# Patient Record
Sex: MALE | Race: WHITE | NOT HISPANIC OR LATINO
[De-identification: names, ages, dates, MRNs, and addresses within clinical notes are randomized per-mention and may not be internally consistent; named-entity substitution may affect disease eponyms.]

---

## 2018-05-17 PROBLEM — Z00.00 ENCOUNTER FOR PREVENTIVE HEALTH EXAMINATION: Status: ACTIVE | Noted: 2018-05-17

## 2018-05-22 ENCOUNTER — APPOINTMENT (OUTPATIENT)
Dept: ORTHOPEDIC SURGERY | Facility: CLINIC | Age: 63
End: 2018-05-22
Payer: COMMERCIAL

## 2018-05-22 DIAGNOSIS — Z86.59 PERSONAL HISTORY OF OTHER MENTAL AND BEHAVIORAL DISORDERS: ICD-10-CM

## 2018-05-22 DIAGNOSIS — Z86.39 PERSONAL HISTORY OF OTHER ENDOCRINE, NUTRITIONAL AND METABOLIC DISEASE: ICD-10-CM

## 2018-05-22 DIAGNOSIS — Z82.62 FAMILY HISTORY OF OSTEOPOROSIS: ICD-10-CM

## 2018-05-22 DIAGNOSIS — Z85.46 PERSONAL HISTORY OF MALIGNANT NEOPLASM OF PROSTATE: ICD-10-CM

## 2018-05-22 DIAGNOSIS — Z60.2 PROBLEMS RELATED TO LIVING ALONE: ICD-10-CM

## 2018-05-22 PROCEDURE — 99203 OFFICE O/P NEW LOW 30 MIN: CPT

## 2018-05-22 SDOH — SOCIAL STABILITY - SOCIAL INSECURITY: PROBLEMS RELATED TO LIVING ALONE: Z60.2

## 2018-08-21 ENCOUNTER — FORM ENCOUNTER (OUTPATIENT)
Age: 63
End: 2018-08-21

## 2018-08-22 ENCOUNTER — APPOINTMENT (OUTPATIENT)
Dept: MRI IMAGING | Facility: CLINIC | Age: 63
End: 2018-08-22
Payer: COMMERCIAL

## 2018-08-22 ENCOUNTER — OUTPATIENT (OUTPATIENT)
Dept: OUTPATIENT SERVICES | Facility: HOSPITAL | Age: 63
LOS: 1 days | End: 2018-08-22
Payer: COMMERCIAL

## 2018-08-22 DIAGNOSIS — Z00.8 ENCOUNTER FOR OTHER GENERAL EXAMINATION: ICD-10-CM

## 2018-08-22 PROCEDURE — 72197 MRI PELVIS W/O & W/DYE: CPT | Mod: 26

## 2018-08-22 PROCEDURE — 82565 ASSAY OF CREATININE: CPT

## 2018-08-22 PROCEDURE — A9585: CPT

## 2018-08-22 PROCEDURE — 72197 MRI PELVIS W/O & W/DYE: CPT

## 2018-09-04 ENCOUNTER — APPOINTMENT (OUTPATIENT)
Dept: ORTHOPEDIC SURGERY | Facility: CLINIC | Age: 63
End: 2018-09-04
Payer: COMMERCIAL

## 2018-09-04 PROCEDURE — 99213 OFFICE O/P EST LOW 20 MIN: CPT

## 2018-12-05 ENCOUNTER — RESULT REVIEW (OUTPATIENT)
Age: 63
End: 2018-12-05

## 2018-12-24 ENCOUNTER — EMERGENCY (EMERGENCY)
Facility: HOSPITAL | Age: 63
LOS: 1 days | Discharge: HOME | End: 2018-12-24
Attending: EMERGENCY MEDICINE
Payer: COMMERCIAL

## 2018-12-24 VITALS
SYSTOLIC BLOOD PRESSURE: 189 MMHG | TEMPERATURE: 99 F | DIASTOLIC BLOOD PRESSURE: 97 MMHG | WEIGHT: 179.9 LBS | HEART RATE: 98 BPM | RESPIRATION RATE: 16 BRPM | OXYGEN SATURATION: 98 %

## 2018-12-24 VITALS
DIASTOLIC BLOOD PRESSURE: 81 MMHG | HEART RATE: 92 BPM | SYSTOLIC BLOOD PRESSURE: 123 MMHG | RESPIRATION RATE: 20 BRPM | TEMPERATURE: 98 F | OXYGEN SATURATION: 99 %

## 2018-12-24 PROCEDURE — 71045 X-RAY EXAM CHEST 1 VIEW: CPT

## 2018-12-24 PROCEDURE — 99283 EMERGENCY DEPT VISIT LOW MDM: CPT

## 2018-12-24 PROCEDURE — 71045 X-RAY EXAM CHEST 1 VIEW: CPT | Mod: 26

## 2018-12-24 NOTE — ED PROVIDER NOTE - MEDICAL DECISION MAKING DETAILS
63M currently being treated for osteomyelitis. Had ABX changed to Ertapenem today, but pt is unsure why. Here in ER for second opinion. Pt is stable, non-toxic appearing. He is adherent to medication, and is able to resume the IV ABX today at home. Will give pt number for ID clinic, but at this current time there is no reason to believe he is being improperly managed as an outpatient. 63M currently being treated for osteomyelitis. Had ABX changed to Ertapenem today, but pt is unsure why. Here in ER for second opinion. Pt is stable, non-toxic appearing. He is adherent to medication, and is able to resume the IV ABX today at home. Will give pt number for ID clinic, but at this current time there is no reason to believe he is being improperly managed as an outpatient.    ATTENDING MD Joann: appears to have mild masseter muscle strain whichs improved with heat and massage. pt resassured about broad coverage of antibiotics, per history is improving appropriately, advised him to go to ID clinic if he needs 2nd opinion. he understands and is amenable, will Dc w/ f./u

## 2018-12-24 NOTE — ED PROVIDER NOTE - OBJECTIVE STATEMENT
63M PMH HLD, HTN, DM, had impacted infected molar removed in october. Went to [facility] last week for IV abx and dc'd with picc line and cephipime. Presenting to ER as pt is having increasing pain and abx were changed to Ertapenem but pt is unsure why. 63M PMH HLD, HTN, DM, had impacted infected molar removed in october. C/b by osteomyelitis, went to [facility] last week for IV abx, seen by ID and dc'd with picc line and cefepime. Presenting to ER as pt is having increasing pain and abx were changed to Ertapenem but pt is unsure why. States swelling has decreased, and he not had any fevers, chills, n/v/d or any other complaints. Came to ER for second opinion regarding ABX. 63M PMH HLD, HTN, DM, had impacted infected molar removed in october. C/b by osteomyelitis, went to [facility] last week for IV abx, seen by ID and lovely'd with picc line and cefepime. Presenting to ER as pt is for some concern that he is not being managed appropriately. He notes his swelling is going down and he does not have systemic symptoms, but in the last few days he has been having some slightly increasing pain when he chews and brushes his teeth. He also notes his abx were changed to Ertapenem from cefepime but pt is unsure why. States swelling has decreased, and he not had any fevers, chills, n/v/d or any other complaints. Came to ER for second opinion regarding ABX.

## 2018-12-24 NOTE — ED PROVIDER NOTE - NS ED ROS FT
Constitution: No Fever or chills  Eyes: No visual changes  HEENT: No URI symptoms  Cardio: No Chest pain  Resp: No SOB  GI: No abdominal pain  : No dysuria  MSK +jaw pain  Neuro: No Headache  Skin: No rashes  All other ROS as per HPI  Ac De La O, PGY-1

## 2018-12-24 NOTE — ED PROVIDER NOTE - PHYSICAL EXAMINATION
General: WDWN  HEENT: There is some R sided swelling over the mandible. TTP over the upper molars, but there is not discharge or fluctuance from the teeth. Trachea midline.   Cardiac: Normal S1 and S2 w/ RRR. No MRG.  Pulmonary: CTA bilaterally. No increased WOB.   Abdominal: Soft, NTND  Neurologic: No focal sensory or motor deficits.  Musculoskeletal: No limited ROM.  Vascular: WWP. PICC line in R upper extremity clean/dry.   Skin: Color appropriate for race.   Psychiatric: Appropriate mood and affect. No apparent risk to self or others.  Ac [place] O, PGY-1 General: WDWN  HEENT: There is some R sided swelling over the mandible. TTP over the upper molars, but there is not discharge or fluctuance from the teeth. Trachea midline.   Cardiac: Normal S1 and S2 w/ RRR. No MRG.  Pulmonary: CTA bilaterally. No increased WOB.   Abdominal: Soft, NTND  Neurologic: No focal sensory or motor deficits.  Musculoskeletal: No limited ROM.  Vascular: WWP. PICC line in R upper extremity clean/dry.   Skin: Color appropriate for race.   Psychiatric: Appropriate mood and affect. No apparent risk to self or others.  Ac [place], PGY-1    ATTENDING MD Joann: well appearing, NAD, pleasant, awake, alert nontoxic. AOx4. mucus membranes are moist, oropharynx is within normal limits, mild buccal swelling, tenderness on R-masseter improve diwth heat and massage, no dysphonia, no trismus, no sublingual tenderness, no fluctulance or purulence intraorally appreciated, no significant facial tenderness. heart normal rate, regular rhythm, lungs clear to auscultation bilaterally, equal breath sounds bilaterally, abd soft nontender, skin warm and dry with no overlying changes.

## 2018-12-24 NOTE — ED ADULT NURSE NOTE - OBJECTIVE STATEMENT
62y/o Male presented to the ED ambulatory, A&Ox3, complaining of jaw pain. Pt states he had an infected/impacted wisdom tooth extraction on october 15th. Pt was admitted to [facility] for osteomyelitis of mandible and was given IV Cefepime for two days. Picc line placed to continue Cefepime at home, last night was the last dose. Pt came to have a second opinion since swelling has gone down but not gone away. Swelling noted on left jaw. MD aware of picc line. Awaiting chest x-ray for placement verification and ok from MD before accessing. First set of cultures taken from butterfly on left arm, second will be taken off of picc line once accessed. Pt denies CP, SOB, fevers, chills, cough, trouble swallowing, difficulty breathing. Pt has a patent airway, is maintaining secretions, and states he has no pain or swelling in his throat. Pt states he has trouble eating because of the pain, but does not have trouble swallowing the food. Pt Hx of Barrets Esophagus, High cholesterol and Htn. Elevated BP noted, other VS stable. Safety and comfort measures provided. Call bell within reach. Awaiting Picc line placement verification and blood test results. Will continue to monitor. 62y/o Male presented to the ED ambulatory, A&Ox3, complaining of jaw pain. Pt states he had an infected/impacted wisdom tooth extraction on october 15th. Pt was admitted to [facility] for osteomyelitis of mandible and was given IV Cefepime for two days. Picc line placed to continue Cefepime at home, last night was the last dose. Pt came to have a second opinion since swelling has gone down but not gone away. Swelling noted on R. jaw. MD aware of picc line. Awaiting chest x-ray for placement verification and ok from MD before accessing. First set of cultures taken from butterfly on left arm, second will be taken off of picc line once accessed. Pt denies CP, SOB, fevers, chills, cough, trouble swallowing, difficulty breathing. Pt has a patent airway, is maintaining secretions, and states he has no pain or swelling in his throat. No tongue swelling. Pt states he has trouble eating because of the pain, but does not have trouble swallowing the food. Pt Hx of Barrets Esophagus, High cholesterol and Htn. Elevated BP noted, other VS stable. Safety and comfort measures provided. Call bell within reach. Awaiting Picc line placement verification and blood test results. Will continue to monitor.

## 2018-12-24 NOTE — ED PROVIDER NOTE - ATTENDING CONTRIBUTION TO CARE
ATTENDING MD:  I, Jerry David, personally have seen and examined this patient.  I have discussed all aspects of care with the resident physician. Resident note reviewed and agree on plan of care and except where noted.  See HPI, PE, and MDM for details.

## 2018-12-24 NOTE — ED ADULT TRIAGE NOTE - CHIEF COMPLAINT QUOTE
osteomyelitis of mandible has PICC for IV antibiotics, has pain to jaw and wants second opinion regarding treatment regimen

## 2018-12-24 NOTE — ED ADULT NURSE NOTE - NSIMPLEMENTINTERV_GEN_ALL_ED
Implemented All Universal Safety Interventions:  [place] to call system. Call bell, personal items and telephone within reach. Instruct patient to call for assistance. Room bathroom lighting operational. Non-slip footwear when patient is off stretcher. Physically safe environment: no spills, clutter or unnecessary equipment. Stretcher in lowest position, wheels locked, appropriate side rails in place.

## 2018-12-24 NOTE — ED PROVIDER NOTE - NSFOLLOWUPCLINICS_GEN_ALL_ED_FT
[facility] - Infectious Disease  Infectious Disease  [address], [place] [address]  Phone: [phone]  Fax:   Follow Up Time:

## 2018-12-24 NOTE — ED PROVIDER NOTE - NSFOLLOWUPINSTRUCTIONS_ED_ALL_ED_FT
Please resume taking antibiotics as prescribed by your current Infectious Disease specialist.     If you are having fevers, chills, difficulty swallowing or difficulty breathing please return to the ER immediately.     Please follow up at your previously scheduled ID appointment.     You may call the [facility] ID clinic as well at the number provided.

## 2018-12-26 ENCOUNTER — EMERGENCY (EMERGENCY)
Facility: HOSPITAL | Age: 63
LOS: 1 days | Discharge: HOME | End: 2018-12-26
Attending: EMERGENCY MEDICINE
Payer: COMMERCIAL

## 2018-12-26 VITALS
HEART RATE: 74 BPM | SYSTOLIC BLOOD PRESSURE: 172 MMHG | RESPIRATION RATE: 18 BRPM | DIASTOLIC BLOOD PRESSURE: 84 MMHG | OXYGEN SATURATION: 99 %

## 2018-12-26 VITALS
HEIGHT: 66 IN | WEIGHT: 179.9 LBS | TEMPERATURE: 99 F | DIASTOLIC BLOOD PRESSURE: 88 MMHG | RESPIRATION RATE: 16 BRPM | SYSTOLIC BLOOD PRESSURE: 156 MMHG | OXYGEN SATURATION: 97 % | HEART RATE: 96 BPM

## 2018-12-26 LAB
ALBUMIN SERPL ELPH-MCNC: 4.3 G/DL — SIGNIFICANT CHANGE UP (ref 3.3–5)
ALP SERPL-CCNC: 81 U/L — SIGNIFICANT CHANGE UP (ref 40–120)
ALT FLD-CCNC: 18 U/L — SIGNIFICANT CHANGE UP (ref 10–45)
ANION GAP SERPL CALC-SCNC: 14 MMOL/L — SIGNIFICANT CHANGE UP (ref 5–17)
AST SERPL-CCNC: 38 U/L — SIGNIFICANT CHANGE UP (ref 10–40)
BASOPHILS # BLD AUTO: 0.1 K/UL — SIGNIFICANT CHANGE UP (ref 0–0.2)
BASOPHILS NFR BLD AUTO: 1.4 % — SIGNIFICANT CHANGE UP (ref 0–2)
BILIRUB SERPL-MCNC: 0.2 MG/DL — SIGNIFICANT CHANGE UP (ref 0.2–1.2)
BUN SERPL-MCNC: 14 MG/DL — SIGNIFICANT CHANGE UP (ref 7–23)
CALCIUM SERPL-MCNC: 9.8 MG/DL — SIGNIFICANT CHANGE UP (ref 8.4–10.5)
CHLORIDE SERPL-SCNC: 103 MMOL/L — SIGNIFICANT CHANGE UP (ref 96–108)
CO2 SERPL-SCNC: 20 MMOL/L — LOW (ref 22–31)
CREAT SERPL-MCNC: 1.02 MG/DL — SIGNIFICANT CHANGE UP (ref 0.5–1.3)
EOSINOPHIL # BLD AUTO: 0.1 K/UL — SIGNIFICANT CHANGE UP (ref 0–0.5)
EOSINOPHIL NFR BLD AUTO: 2.9 % — SIGNIFICANT CHANGE UP (ref 0–6)
GLUCOSE SERPL-MCNC: 100 MG/DL — HIGH (ref 70–99)
HCT VFR BLD CALC: 33.2 % — LOW (ref 39–50)
HGB BLD-MCNC: 11 G/DL — LOW (ref 13–17)
LYMPHOCYTES # BLD AUTO: 1.1 K/UL — SIGNIFICANT CHANGE UP (ref 1–3.3)
LYMPHOCYTES # BLD AUTO: 26.5 % — SIGNIFICANT CHANGE UP (ref 13–44)
MCHC RBC-ENTMCNC: 30.2 PG — SIGNIFICANT CHANGE UP (ref 27–34)
MCHC RBC-ENTMCNC: 33.1 GM/DL — SIGNIFICANT CHANGE UP (ref 32–36)
MCV RBC AUTO: 91.2 FL — SIGNIFICANT CHANGE UP (ref 80–100)
MONOCYTES # BLD AUTO: 0.3 K/UL — SIGNIFICANT CHANGE UP (ref 0–0.9)
MONOCYTES NFR BLD AUTO: 7.3 % — SIGNIFICANT CHANGE UP (ref 2–14)
NEUTROPHILS # BLD AUTO: 2.6 K/UL — SIGNIFICANT CHANGE UP (ref 1.8–7.4)
NEUTROPHILS NFR BLD AUTO: 61.8 % — SIGNIFICANT CHANGE UP (ref 43–77)
PLATELET # BLD AUTO: 339 K/UL — SIGNIFICANT CHANGE UP (ref 150–400)
POTASSIUM SERPL-MCNC: 4.6 MMOL/L — SIGNIFICANT CHANGE UP (ref 3.5–5.3)
POTASSIUM SERPL-SCNC: 4.6 MMOL/L — SIGNIFICANT CHANGE UP (ref 3.5–5.3)
PROT SERPL-MCNC: 7.7 G/DL — SIGNIFICANT CHANGE UP (ref 6–8.3)
RBC # BLD: 3.65 M/UL — LOW (ref 4.2–5.8)
RBC # FLD: 16.9 % — HIGH (ref 10.3–14.5)
SODIUM SERPL-SCNC: 137 MMOL/L — SIGNIFICANT CHANGE UP (ref 135–145)
WBC # BLD: 4.2 K/UL — SIGNIFICANT CHANGE UP (ref 3.8–10.5)
WBC # FLD AUTO: 4.2 K/UL — SIGNIFICANT CHANGE UP (ref 3.8–10.5)

## 2018-12-26 PROCEDURE — 70487 CT MAXILLOFACIAL W/DYE: CPT | Mod: 26

## 2018-12-26 PROCEDURE — 99284 EMERGENCY DEPT VISIT MOD MDM: CPT

## 2018-12-26 PROCEDURE — 80053 COMPREHEN METABOLIC PANEL: CPT

## 2018-12-26 PROCEDURE — 85027 COMPLETE CBC AUTOMATED: CPT

## 2018-12-26 PROCEDURE — 99284 EMERGENCY DEPT VISIT MOD MDM: CPT | Mod: 25

## 2018-12-26 PROCEDURE — 96374 THER/PROPH/DIAG INJ IV PUSH: CPT | Mod: XU

## 2018-12-26 PROCEDURE — 70487 CT MAXILLOFACIAL W/DYE: CPT

## 2018-12-26 RX ORDER — ERTAPENEM SODIUM 1 G/1
1000 INJECTION, POWDER, LYOPHILIZED, FOR SOLUTION INTRAMUSCULAR; INTRAVENOUS ONCE
Qty: 0 | Refills: 0 | Status: COMPLETED | OUTPATIENT
Start: 2018-12-26 | End: 2018-12-26

## 2018-12-26 RX ORDER — ACETAMINOPHEN 500 MG
975 TABLET ORAL ONCE
Qty: 0 | Refills: 0 | Status: COMPLETED | OUTPATIENT
Start: 2018-12-26 | End: 2018-12-26

## 2018-12-26 RX ORDER — IBUPROFEN 200 MG
400 TABLET ORAL ONCE
Qty: 0 | Refills: 0 | Status: COMPLETED | OUTPATIENT
Start: 2018-12-26 | End: 2018-12-26

## 2018-12-26 RX ADMIN — Medication 975 MILLIGRAM(S): at 18:28

## 2018-12-26 RX ADMIN — Medication 400 MILLIGRAM(S): at 18:28

## 2018-12-26 RX ADMIN — ERTAPENEM SODIUM 120 MILLIGRAM(S): 1 INJECTION, POWDER, LYOPHILIZED, FOR SOLUTION INTRAMUSCULAR; INTRAVENOUS at 15:01

## 2018-12-26 NOTE — ED ADULT TRIAGE NOTE - CHIEF COMPLAINT QUOTE
Pain in right side of mouth / top and bottom with swelling. Has been on antibiotics for the past two months

## 2018-12-26 NOTE — ED PROVIDER NOTE - MEDICAL DECISION MAKING DETAILS
Attending note-patient with increasing pain in the right side of mandible with prior history of osteomyelitis and currently getting a IV antibiotics through a PICC line. A repeat CT scan with contrast and compared to CT of December 12. Basic labs.

## 2018-12-26 NOTE — ED ADULT NURSE NOTE - OBJECTIVE STATEMENT
62 yo presents to the ED from home. A&Ox4, ambulatory 64 yo presents to the ED from home. A&Ox4, ambulatory co R upper and lower tooth pain. had impacted infected molar removed in october. C/b by osteomyelitis, went to [facility] last week for IV abx, seen by ID and dc'd with picc line in R upper arm on Ertapenem. coming to ER again because pt was referred to ID after visit on Monday and when he called the clinic was told he needed a referral and to go back to the ER to get a referral. Swelling continues to improve however having worsening pain w/ chewing/using jaw which is reason for patient concern and desire to see new ID doc. On day 3 of Ertapenem. No F/C. No N/V/D. VSS. 20G inserted L wrist.

## 2018-12-26 NOTE — CONSULT NOTE ADULT - SUBJECTIVE AND OBJECTIVE BOX
Patient is a 63y old  Male who presents with a chief complaint of mouth pain.    HPI: Patient had lower right third molar extracted in October 2018 due to pain and swelling by an outpatient oral surgeon. Pain and swelling did not immediately resolve and was seen for multiple post operative visits where debridements and multiple changes in antibiotic coverage was made. Patient continued to have pain and swelling and saw a different oral surgeon on 12/15/2018 who took an additional CT scan which showed soft tissue infection associated with the right masseter with associated osteomyelitis. Patient visited [facility] one week ago and was admitted under Infectious Diseases. A PICC line was placed and patient was prescribed cefepime and later ertapenem (currently taking) which resolved the swelling but increased the pain. Patient visited [facility] ED today.      PAST MEDICAL & SURGICAL HISTORY:  No pertinent past medical history  No significant past surgical history    Depression  Degenerative disc disease (lower back)  Lawler's Esophagus  High cholesterol  High blood pressure  Arteriosclerosis  Atherosclerosis  Enlarged prostate  Osteopenia      MEDICATIONS  (STANDING):    Duloxetine  Bupropion  Mirtazepam  Lorazepam  Ranitidine  Rosuvastatin  Amlodipine  Clobetasol  Aspirin  Ferrous Sulfate  Terazosin  Ertapenem    MEDICATIONS  (PRN):      Allergies    No Known Allergies    Intolerances        FAMILY HISTORY:      *SOCIAL HISTORY: (guardian or who pt came with), (smoking hx)    *Last Dental Visit:    Vital Signs Last 24 Hrs  T(C): 36.7 (26 Dec 2018 15:36), Max: 37 (26 Dec 2018 12:58)  T(F): 98 (26 Dec 2018 15:36), Max: 98.6 (26 Dec 2018 12:58)  HR: 74 (26 Dec 2018 19:55) (74 - 96)  BP: 172/84 (26 Dec 2018 19:55) (156/88 - 172/84)  BP(mean): --  RR: 18 (26 Dec 2018 19:55) (16 - 18)  SpO2: 99% (26 Dec 2018 19:55) (97% - 99%)    EOE:  TMJ ( -  ) clicks                    ( -   ) pops                    (  -  ) crepitus             ( -  ) trismus. Patient demonstrates pain upon opening but is able to be stretched past 35mm+ opening             ( -  ) LAD             ( -  ) swelling             ( -  ) asymmetry             ( +  ) palpation. Pain on palpation of right cheek in the region of third molar extraction site             ( -  ) SOB             ( -  ) dysphagia             ( -  ) LOC    Patient states that he experiences mild parasthesia on lower right side and that pain extends from extraction socket (greatest pain) to the lower midline (mild pain). Pain is rated as 4/10 overall.    IOE:    Extraction site #32 appears to continue healing normally with no signs of erythema, purluence or fluctuance. Pain upon palpation further posteriorly toward the masseter muscle but no pain upon palpation of the extraction site or the associated soft tissue. Floor of mouth and buccal vestibule normal.    Radiographs: Maxillofacial CT taken    Mixed lucent/sclerotic changes within the mandible adjacent to an unhealed   right third molar extraction socket with juxta mandibular soft tissue   swelling and fat plane reticulation. Findings favor an inflammatory process   such as osteomyelitis. Enlargement of the right masseter is compatible with   associated myositis. The possibility of osteonecrosis is raised in the   appropriate clinical setting.     LABS:                        11.0   4.2   )-----------( 339      ( 26 Dec 2018 15:09 )             33.2     12-26    137  |  103  |  14  ----------------------------<  100<H>  4.6   |  20<L>  |  1.02    Ca    9.8      26 Dec 2018 15:09    TPro  7.7  /  Alb  4.3  /  TBili  0.2  /  DBili  x   /  AST  38  /  ALT  18  /  AlkPhos  81  12-26    WBC Count: 4.2 K/uL [3.8 - 10.5] (12-26 @ 15:09)    Platelet Count - Automated: 339 K/uL [150 - 400] (12-26 @ 15:09)    ASSESSMENT: My assessment is extraction site #32 appears to be healing normally. Osteomyelitis cannot be ruled out but dental source appears to have been resolved due to lack of acute findings other than pain.    PROCEDURE:  Extraoral and intraoral examination.    RECOMMENDATIONS:   1) Consult other medical specialties (Neurology, ID, Pain management etc.) for other possible causes of pain.  2) Dental F/U with outpatient dentist for comprehensive dental care.   3) If any oral swelling returns, page dental.    Sajan Pagan DDS, pager #[phone]  Oral surgeon consulted: Dr. Avinash Sheikh

## 2018-12-26 NOTE — ED PROVIDER NOTE - CARE PROVIDERS DIRECT ADDRESSES
,DirectAddress_Unknown,[email or web address],DirectAddress_Unknown

## 2018-12-26 NOTE — ED PROVIDER NOTE - PHYSICAL EXAMINATION
PHYSICAL EXAM:  GENERAL: NAD, speaks in full sentences, no signs of respiratory distress  HEAD:  Atraumatic, Normocephalic  EYES: EOMI, PERRLA, conjunctiva and sclera clear  Jaw: Swelling of R parotid, tender  Oropharynx: Poor dention no abscess/swelling apprieciated  CHEST/LUNG: Clear to auscultation bilaterally; No wheeze; No crackles; No accessory muscles used  HEART: Regular rate and rhythm; No murmurs;   ABDOMEN: Soft, Nontender, Nondistended; Bowel sounds present; No guarding  EXTREMITIES:  2+ Peripheral Pulses, No cyanosis or edema  PSYCH: AAOx3  NEUROLOGY: moving all extremities PHYSICAL EXAM:  GENERAL: NAD, speaks in full sentences, no signs of respiratory distress  HEAD:  Atraumatic, Normocephalic  EYES: EOMI, PERRLA, conjunctiva and sclera clear  Jaw: Swelling of R parotid, tender  Oropharynx: Poor dention no abscess/swelling apprieciated  CHEST/LUNG: Clear to auscultation bilaterally; No wheeze; No crackles; No accessory muscles used  HEART: Regular rate and rhythm; No murmurs;   ABDOMEN: Soft, Nontender, Nondistended; Bowel sounds present; No guarding  EXTREMITIES:  2+ Peripheral Pulses, No cyanosis or edema  PSYCH: AAOx3  NEUROLOGY: moving all extremities      Attending note. Patient is alert and in no acute distress. There is no facial swelling or erythema. Patient has tenderness along the right side of the mandible. There is some gingival tenderness on the right lower teeth opposite tooth #32/31. There is no fluctuance. Sublingual space is soft and nontender. There is no submandibular adenopathy. Patient has no trismus. Patient has some decreased sensation of the right lower lip. TMJ is nontender.

## 2018-12-26 NOTE — ED PROVIDER NOTE - NSFOLLOWUPINSTRUCTIONS_ED_ALL_ED_FT
1) Please follow-up with your Primary Medical Doctor in 3-5 days. You should follow up first with Infectious Disease and OMFS. I am referring you to an Infectious Disease physician, phone number is provided. Also may try Neurology and Pain Management consultations.   2) Return to the Emergency Department if you experiences: fevers, chills, dizziness, weakness, worsening of swelling, difficulty breathing, or symptoms that are new or recurrent.  3) Continue with the previously prescribed regimen of medications.

## 2018-12-26 NOTE — ED PROVIDER NOTE - OBJECTIVE STATEMENT
63M PMH HLD, HTN, DM, had impacted infected molar removed in october. C/b by osteomyelitis, went to [facility] last week for IV abx, seen by ID and dc'd with picc line and cefepime.    Coming to ER again because pt was reffered to ID after visit on monday and when he called the clinic was told he needed a refferal and to go back to the ER to get a refferal. Swelling continues to improve however having worsening pain w/ chewing/using jaw. On day 3 of ertepenam. No F/C. No N/V/D. Has ID at 63M PMH HLD, HTN, DM, had impacted infected molar removed in october. C/b by osteomyelitis, went to [facility] last week for IV abx, seen by ID and lovely'd with picc line and cefepime.    Coming to ER again because pt was referred to ID after visit on Monday and when he called the clinic was told he needed a referral and to go back to the ER to get a referral. Swelling continues to improve however having worsening pain w/ chewing/using jaw. On day 3 of ertepenam. No F/C. No N/V/D. Has ID at [facility] but wants new doctor. No h/o DM. 63M PMH HLD, HTN, DM, had impacted infected molar removed in october. C/b by osteomyelitis, went to \[facility]"" last week for IV abx, seen by ID and dc'd with picc line on ertapenamn p/w cc of pain    Coming to ER again because pt was referred to ID after visit on Monday and when he called the clinic was told he needed a referral and to go back to the ER to get a referral. Swelling continues to improve however having worsening pain w/ chewing/using jaw which is reason for patient concern and desire to see new ID doc. On day 3 of ertepenam. No F/C. No N/V/D. Denies h/o DM. 63M PMH HLD, HTN, DM, had impacted infected molar removed in october. C/b by osteomyelitis, went to \[facility]\"" last week for IV abx, seen by ID and dc'd with picc line on ertapenamn p/w cc of pain    Coming to ER again because pt was referred to ID after visit on Monday and when he called the clinic was told he needed a referral and to go back to the ER to get a referral. Swelling continues to improve however having worsening pain w/ chewing/using jaw which is reason for patient concern and desire to see new ID doc. On day 3 of ertepenam. No F/C. No N/V/D. Denies h/o DM.      Attending note. Patient was seen in the fast track room #5. Patient is complaining of increased pain in the right mandible and maxilla. The patient had tooth extraction in October and subsequently developed osteomyelitis. Patient currently has a PICC line and is getting IV Ertapenem.  Patient initially had significant swelling which has resolved and not returned. He has no fever chills or sweats. Pain is exacerbated by biting and opening his mouth. Patient is concerned he's getting recurrent infection.

## 2018-12-26 NOTE — ED PROVIDER NOTE - CARE PROVIDER_API CALL
Avinash Sheikh (DDS; MD), Surgery Dental Medicine  [address] [place] [address]  Phone: [phone]  Fax: [phone]    Colton Hahn), Geriatric Medicine; Infectious Disease; Internal Medicine  [address] [place] [address]  Phone: [phone]  Fax: [phone]    Leslie Betancourt), Neurology  [address] [place] [address]  Phone: [phone]

## 2019-01-03 ENCOUNTER — APPOINTMENT (OUTPATIENT)
Dept: INFECTIOUS DISEASE | Facility: CLINIC | Age: 64
End: 2019-01-03
Payer: COMMERCIAL

## 2019-01-03 VITALS
DIASTOLIC BLOOD PRESSURE: 78 MMHG | RESPIRATION RATE: 16 BRPM | TEMPERATURE: 97 F | WEIGHT: 175 LBS | HEIGHT: 66 IN | OXYGEN SATURATION: 97 % | HEART RATE: 88 BPM | BODY MASS INDEX: 28.12 KG/M2 | SYSTOLIC BLOOD PRESSURE: 152 MMHG

## 2019-01-03 PROCEDURE — 99243 OFF/OP CNSLTJ NEW/EST LOW 30: CPT

## 2019-01-08 NOTE — REVIEW OF SYSTEMS
[As Noted in HPI] : as noted in HPI [Earache] : no earache [Loss Of Hearing] : no hearing loss [Sore Throat] : no sore throat [Hoarseness] : no hoarseness [Negative] : Heme/Lymph

## 2019-01-08 NOTE — DATA REVIEWED
[FreeTextEntry1] : Cultures - serratia, anerobes\par \par Ct maxillofacial 12/12/18 - soft tissue infection involving right masseter and adjacent soft tissues and probable mandible OM

## 2019-01-08 NOTE — PHYSICAL EXAM
[General Appearance - Alert] : alert [General Appearance - In No Acute Distress] : in no acute distress [Sclera] : the sclera and conjunctiva were normal [PERRL With Normal Accommodation] : pupils were equal in size, round, reactive to light [Extraocular Movements] : extraocular movements were intact [Examination Of The Oral Cavity] : the lips and gums were normal [Oropharynx] : the oropharynx was normal with no thrush [Auscultation Breath Sounds / Voice Sounds] : lungs were clear to auscultation bilaterally [Heart Sounds] : normal S1 and S2 [Full Pulse] : the pedal pulses are present [Edema] : there was no peripheral edema [Bowel Sounds] : normal bowel sounds [Abdomen Soft] : soft [Abdomen Tenderness] : non-tender [Abdomen Mass (___ Cm)] : no abdominal mass palpated [Costovertebral Angle Tenderness] : no CVA tenderness [Musculoskeletal - Swelling] : no joint swelling [Skin Color & Pigmentation] : normal skin color and pigmentation [] : no rash [FreeTextEntry1] : rIGHT ARM PICC LINE INTACT [Deep Tendon Reflexes (DTR)] : deep tendon reflexes were 2+ and symmetric [Sensation] : the sensory exam was normal to light touch and pinprick [No Focal Deficits] : no focal deficits [Oriented To Time, Place, And Person] : oriented to person, place, and time [Affect] : the affect was normal

## 2019-01-08 NOTE — ASSESSMENT
[FreeTextEntry1] : 64 y/o male with right masseter infection/mandible OM comes for visit. \par \par He is on invanz and 2 weeks into therapy. Continue 6 weeks of therapy. \par \par Check weekly labs cbc, cmp, esr, crp.\par \par F/u with original ID doctor for care.\par \par If pt does not improve, may needs surgical intervention of his jaw. \par \par D/w pt in detail.   [Treatment Education] : treatment education [Treatment Adherence] : treatment adherence [Rx Dose / Side Effects] : Rx dose/side effects

## 2019-01-08 NOTE — CONSULT LETTER
[Dear  ___] : Dear  [unfilled], [Consult Letter:] : I had the pleasure of evaluating your patient, [unfilled]. [( Thank you for referring [unfilled] for consultation for _____ )] : Thank you for referring [unfilled] for consultation for [unfilled] [Please see my note below.] : Please see my note below. [Consult Closing:] : Thank you very much for allowing me to participate in the care of this patient.  If you have any questions, please do not hesitate to contact me. [Sincerely,] : Sincerely, [FreeTextEntry2] : Nestor Alfonso DDS\par [address]\[place] [address] [FreeTextEntry3] : Josias Yanez\par Attending Physician\par Infectious Disease\par [facility]\par [facility]\par [address]\[place] [address]\par Tel: [phone]\par Fax: [phone]\par Email: [email or web address] \par

## 2019-01-08 NOTE — HISTORY OF PRESENT ILLNESS
[FreeTextEntry1] : 64 y/o male comes for visit for jaw OM/right masseter infection. \par \par He is currently on IV abx and under the care of another ID doctor and here for 2nd opinion. \par \par His right side jaw pan and swelling is improving. has been on IV abx for almost 2 weeks and scheduled to complete a 6 week course. \par \par No fevers. No Gi issues. No complaints with PICC.\par \par His issues started in October 2018 and fond to have an impacted wisdom tooth and had extraction done on 10/15/18. Postprocedure had pain and swelling and last month found to have  OM of jaw and put on invanz at [facility]. \par \par

## 2019-01-17 ENCOUNTER — APPOINTMENT (OUTPATIENT)
Dept: INFECTIOUS DISEASE | Facility: CLINIC | Age: 64
End: 2019-01-17
Payer: COMMERCIAL

## 2019-01-17 VITALS
OXYGEN SATURATION: 98 % | SYSTOLIC BLOOD PRESSURE: 144 MMHG | BODY MASS INDEX: 28.12 KG/M2 | TEMPERATURE: 97.1 F | WEIGHT: 175 LBS | HEART RATE: 81 BPM | HEIGHT: 66 IN | DIASTOLIC BLOOD PRESSURE: 90 MMHG

## 2019-01-17 DIAGNOSIS — M89.9 DISORDER OF BONE, UNSPECIFIED: ICD-10-CM

## 2019-01-17 PROCEDURE — 99214 OFFICE O/P EST MOD 30 MIN: CPT

## 2019-01-17 NOTE — ASSESSMENT
[FreeTextEntry1] : 64 y/o male with right masseter infection/mandible OM comes for visit. \par \par He is on invanz and 4 weeks into therapy. Continue 6 weeks of therapy. \par \par LASt CBC ok. ESR and CRP improved.\par \par To see 3rd surgical opinion next week\par \par If pt does not improve, may needs surgical intervention of his jaw. \par \par D/w pt in detail.   [Treatment Education] : treatment education [Treatment Adherence] : treatment adherence [Rx Dose / Side Effects] : Rx dose/side effects

## 2019-01-17 NOTE — CONSULT LETTER
[Dear  ___] : Dear  [unfilled], [Courtesy Letter:] : I had the pleasure of seeing your patient, [unfilled], in my office today. [Sincerely,] : Sincerely, [FreeTextEntry2] : Nestor Valladares, DMD\par [address], [place] [address] [FreeTextEntry3] : Josias Yanez\par Attending Physician\par Infectious Disease\par [facility]\par [facility]\par [address]\[place] [address]\par Tel: [phone]\par Fax: [phone]\par Email: [email or web address]\par \par

## 2019-01-17 NOTE — HISTORY OF PRESENT ILLNESS
Spontaneous, unlabored and symmetrical [FreeTextEntry1] : 64 y/o male comes for f/u visit for Jaw OM/abscess.\par \par He is doing better. No complaints with PICC line. \par \par No fever. Occ right side pain in jaw.\par \par No nausea, vomiting, diarrhea, abd pain. No CP/SOB.

## 2019-01-30 ENCOUNTER — OUTPATIENT (OUTPATIENT)
Dept: OUTPATIENT SERVICES | Facility: HOSPITAL | Age: 64
LOS: 1 days | End: 2019-01-30

## 2019-01-30 VITALS
SYSTOLIC BLOOD PRESSURE: 130 MMHG | DIASTOLIC BLOOD PRESSURE: 86 MMHG | OXYGEN SATURATION: 96 % | RESPIRATION RATE: 14 BRPM | HEART RATE: 91 BPM | TEMPERATURE: 98 F | HEIGHT: 66.5 IN | WEIGHT: 184.09 LBS

## 2019-01-30 DIAGNOSIS — L91.8 OTHER HYPERTROPHIC DISORDERS OF THE SKIN: Chronic | ICD-10-CM

## 2019-01-30 DIAGNOSIS — Z98.890 OTHER SPECIFIED POSTPROCEDURAL STATES: Chronic | ICD-10-CM

## 2019-01-30 DIAGNOSIS — F32.9 MAJOR DEPRESSIVE DISORDER, SINGLE EPISODE, UNSPECIFIED: ICD-10-CM

## 2019-01-30 DIAGNOSIS — M86.169 OTHER ACUTE OSTEOMYELITIS, UNSPECIFIED TIBIA AND FIBULA: ICD-10-CM

## 2019-01-30 DIAGNOSIS — K21.9 GASTRO-ESOPHAGEAL REFLUX DISEASE WITHOUT ESOPHAGITIS: ICD-10-CM

## 2019-01-30 DIAGNOSIS — M86.9 OSTEOMYELITIS, UNSPECIFIED: ICD-10-CM

## 2019-01-30 DIAGNOSIS — D64.9 ANEMIA, UNSPECIFIED: ICD-10-CM

## 2019-01-30 DIAGNOSIS — G47.33 OBSTRUCTIVE SLEEP APNEA (ADULT) (PEDIATRIC): ICD-10-CM

## 2019-01-30 LAB
BLD GP AB SCN SERPL QL: NEGATIVE — SIGNIFICANT CHANGE UP
HCT VFR BLD CALC: 38.1 % — LOW (ref 39–50)
HGB BLD-MCNC: 12.2 G/DL — LOW (ref 13–17)
MCHC RBC-ENTMCNC: 30.6 PG — SIGNIFICANT CHANGE UP (ref 27–34)
MCHC RBC-ENTMCNC: 32 % — SIGNIFICANT CHANGE UP (ref 32–36)
MCV RBC AUTO: 95.5 FL — SIGNIFICANT CHANGE UP (ref 80–100)
NRBC # FLD: 0 K/UL — LOW (ref 25–125)
PLATELET # BLD AUTO: 236 K/UL — SIGNIFICANT CHANGE UP (ref 150–400)
PMV BLD: 9.9 FL — SIGNIFICANT CHANGE UP (ref 7–13)
RBC # BLD: 3.99 M/UL — LOW (ref 4.2–5.8)
RBC # FLD: 17.5 % — HIGH (ref 10.3–14.5)
RH IG SCN BLD-IMP: POSITIVE — SIGNIFICANT CHANGE UP
WBC # BLD: 4.3 K/UL — SIGNIFICANT CHANGE UP (ref 3.8–10.5)
WBC # FLD AUTO: 4.3 K/UL — SIGNIFICANT CHANGE UP (ref 3.8–10.5)

## 2019-01-30 NOTE — H&P PST ADULT - NEGATIVE NEUROLOGICAL SYMPTOMS
no paresthesias/no headache/no focal seizures/no transient paralysis/no weakness/no generalized seizures

## 2019-01-30 NOTE — H&P PST ADULT - PMH
No Anemia    Lawler esophagus    BPH (benign prostatic hyperplasia)    Degenerative disc disease, lumbar  & thoracic  Depression    GERD (gastroesophageal reflux disease)    Hyperlipidemia    Hypertension    Lung nodules  stable based on recent CT scan  Osteomyelitis  right jaw  Osteopenia

## 2019-01-30 NOTE — H&P PST ADULT - NSANTHOSAYNRD_GEN_A_CORE
No. DOUGLAS screening performed.  STOP BANG Legend: 0-2 = LOW Risk; 3-4 = INTERMEDIATE Risk; 5-8 = HIGH Risk

## 2019-01-30 NOTE — H&P PST ADULT - SKIN COMMENTS
Small superficial area of redness adjacent to tegaderm dressing, PICC insertion site with old dry blood, dressing intact

## 2019-01-30 NOTE — H&P PST ADULT - ENMT COMMENTS
Pain with chewing in posterior jaw of right side, pre-op diagnosis osteomyelitis of right jaw. Pre-op diagnosis osteomyelitis of right jaw, mild swelling of right jaw

## 2019-01-30 NOTE — H&P PST ADULT - RS GEN PE MLT RESP DETAILS PC
airway patent/breath sounds equal/clear to auscultation bilaterally/good air movement/respirations non-labored/no wheezes

## 2019-01-30 NOTE — H&P PST ADULT - HISTORY OF PRESENT ILLNESS
62 yo male presents to PST unit with pre-op diagnosis of other acute osteomyelitis, other site scheduled for debridement right mandible extra oral approach and placement of reconstruction plate, possible bone graft on 02/12/2019. He reports infected wisdom tooth in October 2018, caused osteomyelitis of his right jaw and he is now receiving daily IV Ivanz via right PICC line.

## 2019-01-30 NOTE — H&P PST ADULT - ATTENDING COMMENTS
Pt is a 64 yo male who present with chronic osteomyelitis of the right mandible following extraction of right mandibular molar 4 several months ago. Pt has been receiving IV ertipenum without improvement in symptoms or radiographic presentation. Recent CT scan show progression of osteolysis to the inferior border with risk of pathologic fracture. Plan today for exploration right mandible via an extra-oral approach for debridement and placement of reconstruction plate.  R/B reviewed with patient and consent obtained

## 2019-01-30 NOTE — H&P PST ADULT - MUSCULOSKELETAL
details… detailed exam no joint erythema/no joint warmth/no calf tenderness/normal strength/no joint swelling/ROM intact

## 2019-01-30 NOTE — H&P PST ADULT - PROBLEM SELECTOR PLAN 1
scheduled for debridement right mandible extra oral approach and placement of reconstruction plate, possible bone graft on 02/12/2019.  Pre-Op instructions provided to patient.

## 2019-02-11 ENCOUNTER — TRANSCRIPTION ENCOUNTER (OUTPATIENT)
Age: 64
End: 2019-02-11

## 2019-02-11 NOTE — ASU PATIENT PROFILE, ADULT - PMH
Anemia    Lawler esophagus    BPH (benign prostatic hyperplasia)    Degenerative disc disease, lumbar  & thoracic  Depression    GERD (gastroesophageal reflux disease)    Hyperlipidemia    Hypertension    Lung nodules  stable based on recent CT scan  Osteomyelitis  right jaw  Osteopenia

## 2019-02-12 ENCOUNTER — INPATIENT (INPATIENT)
Facility: HOSPITAL | Age: 64
LOS: 0 days | Discharge: HOME HEALTH | End: 2019-02-13
Attending: ORAL & MAXILLOFACIAL SURGERY | Admitting: ORAL & MAXILLOFACIAL SURGERY
Payer: COMMERCIAL

## 2019-02-12 ENCOUNTER — RESULT REVIEW (OUTPATIENT)
Age: 64
End: 2019-02-12

## 2019-02-12 VITALS
SYSTOLIC BLOOD PRESSURE: 137 MMHG | HEIGHT: 66 IN | RESPIRATION RATE: 15 BRPM | WEIGHT: 184.09 LBS | TEMPERATURE: 98 F | HEART RATE: 103 BPM | DIASTOLIC BLOOD PRESSURE: 82 MMHG | OXYGEN SATURATION: 100 %

## 2019-02-12 DIAGNOSIS — M86.169 OTHER ACUTE OSTEOMYELITIS, UNSPECIFIED TIBIA AND FIBULA: ICD-10-CM

## 2019-02-12 DIAGNOSIS — Z98.890 OTHER SPECIFIED POSTPROCEDURAL STATES: Chronic | ICD-10-CM

## 2019-02-12 DIAGNOSIS — L91.8 OTHER HYPERTROPHIC DISORDERS OF THE SKIN: Chronic | ICD-10-CM

## 2019-02-12 LAB
GRAM STN WND: SIGNIFICANT CHANGE UP
RH IG SCN BLD-IMP: POSITIVE — SIGNIFICANT CHANGE UP
SPECIMEN SOURCE: SIGNIFICANT CHANGE UP

## 2019-02-12 PROCEDURE — 88305 TISSUE EXAM BY PATHOLOGIST: CPT | Mod: 26

## 2019-02-12 PROCEDURE — 88311 DECALCIFY TISSUE: CPT | Mod: 26

## 2019-02-12 RX ORDER — BUPROPION HYDROCHLORIDE 150 MG/1
1 TABLET, EXTENDED RELEASE ORAL
Qty: 0 | Refills: 0 | COMMUNITY

## 2019-02-12 RX ORDER — ROSUVASTATIN CALCIUM 5 MG/1
1 TABLET ORAL
Qty: 0 | Refills: 0 | COMMUNITY

## 2019-02-12 RX ORDER — OXYCODONE HYDROCHLORIDE 5 MG/1
5 TABLET ORAL EVERY 6 HOURS
Qty: 0 | Refills: 0 | Status: DISCONTINUED | OUTPATIENT
Start: 2019-02-12 | End: 2019-02-13

## 2019-02-12 RX ORDER — MIRTAZAPINE 45 MG/1
30 TABLET, ORALLY DISINTEGRATING ORAL ONCE
Qty: 0 | Refills: 0 | Status: DISCONTINUED | OUTPATIENT
Start: 2019-02-12 | End: 2019-02-13

## 2019-02-12 RX ORDER — ONDANSETRON 8 MG/1
4 TABLET, FILM COATED ORAL EVERY 6 HOURS
Qty: 0 | Refills: 0 | Status: DISCONTINUED | OUTPATIENT
Start: 2019-02-12 | End: 2019-02-13

## 2019-02-12 RX ORDER — ASPIRIN/CALCIUM CARB/MAGNESIUM 324 MG
1 TABLET ORAL
Qty: 0 | Refills: 0 | COMMUNITY

## 2019-02-12 RX ORDER — MORPHINE SULFATE 50 MG/1
2 CAPSULE, EXTENDED RELEASE ORAL EVERY 6 HOURS
Qty: 0 | Refills: 0 | Status: DISCONTINUED | OUTPATIENT
Start: 2019-02-12 | End: 2019-02-13

## 2019-02-12 RX ORDER — RANITIDINE HYDROCHLORIDE 150 MG/1
1 TABLET, FILM COATED ORAL
Qty: 0 | Refills: 0 | COMMUNITY

## 2019-02-12 RX ORDER — DULOXETINE HYDROCHLORIDE 30 MG/1
60 CAPSULE, DELAYED RELEASE ORAL DAILY
Qty: 0 | Refills: 0 | Status: DISCONTINUED | OUTPATIENT
Start: 2019-02-12 | End: 2019-02-13

## 2019-02-12 RX ORDER — CEFAZOLIN SODIUM 1 G
1000 VIAL (EA) INJECTION EVERY 8 HOURS
Qty: 0 | Refills: 0 | Status: COMPLETED | OUTPATIENT
Start: 2019-02-12 | End: 2019-02-12

## 2019-02-12 RX ORDER — ERTAPENEM SODIUM 1 G/1
1000 INJECTION, POWDER, LYOPHILIZED, FOR SOLUTION INTRAMUSCULAR; INTRAVENOUS EVERY 24 HOURS
Qty: 0 | Refills: 0 | Status: DISCONTINUED | OUTPATIENT
Start: 2019-02-12 | End: 2019-02-13

## 2019-02-12 RX ORDER — OXYCODONE HYDROCHLORIDE 5 MG/1
10 TABLET ORAL EVERY 6 HOURS
Qty: 0 | Refills: 0 | Status: DISCONTINUED | OUTPATIENT
Start: 2019-02-12 | End: 2019-02-13

## 2019-02-12 RX ORDER — TERAZOSIN HYDROCHLORIDE 10 MG/1
1 CAPSULE ORAL
Qty: 0 | Refills: 0 | COMMUNITY

## 2019-02-12 RX ORDER — CHLORHEXIDINE GLUCONATE 213 G/1000ML
15 SOLUTION TOPICAL
Qty: 0 | Refills: 0 | Status: DISCONTINUED | OUTPATIENT
Start: 2019-02-12 | End: 2019-02-13

## 2019-02-12 RX ORDER — FAMOTIDINE 10 MG/ML
20 INJECTION INTRAVENOUS EVERY 12 HOURS
Qty: 0 | Refills: 0 | Status: DISCONTINUED | OUTPATIENT
Start: 2019-02-12 | End: 2019-02-13

## 2019-02-12 RX ORDER — ATORVASTATIN CALCIUM 80 MG/1
40 TABLET, FILM COATED ORAL AT BEDTIME
Qty: 0 | Refills: 0 | Status: DISCONTINUED | OUTPATIENT
Start: 2019-02-12 | End: 2019-02-13

## 2019-02-12 RX ORDER — L.ACIDOPH/B.ANIMALIS/B.LONGUM 15B CELL
1 CAPSULE ORAL
Qty: 0 | Refills: 0 | COMMUNITY

## 2019-02-12 RX ORDER — MIRTAZAPINE 45 MG/1
1 TABLET, ORALLY DISINTEGRATING ORAL
Qty: 0 | Refills: 0 | COMMUNITY

## 2019-02-12 RX ORDER — METOCLOPRAMIDE HCL 10 MG
10 TABLET ORAL EVERY 8 HOURS
Qty: 0 | Refills: 0 | Status: DISCONTINUED | OUTPATIENT
Start: 2019-02-12 | End: 2019-02-13

## 2019-02-12 RX ORDER — IBUPROFEN 200 MG
600 TABLET ORAL EVERY 6 HOURS
Qty: 0 | Refills: 0 | Status: DISCONTINUED | OUTPATIENT
Start: 2019-02-12 | End: 2019-02-13

## 2019-02-12 RX ORDER — AMLODIPINE BESYLATE 2.5 MG/1
5 TABLET ORAL DAILY
Qty: 0 | Refills: 0 | Status: DISCONTINUED | OUTPATIENT
Start: 2019-02-12 | End: 2019-02-13

## 2019-02-12 RX ORDER — BUPROPION HYDROCHLORIDE 150 MG/1
150 TABLET, EXTENDED RELEASE ORAL DAILY
Qty: 0 | Refills: 0 | Status: DISCONTINUED | OUTPATIENT
Start: 2019-02-12 | End: 2019-02-13

## 2019-02-12 RX ORDER — ERTAPENEM SODIUM 1 G/1
0 INJECTION, POWDER, LYOPHILIZED, FOR SOLUTION INTRAMUSCULAR; INTRAVENOUS
Qty: 0 | Refills: 0 | COMMUNITY

## 2019-02-12 RX ORDER — SODIUM CHLORIDE 9 MG/ML
1000 INJECTION, SOLUTION INTRAVENOUS
Qty: 0 | Refills: 0 | Status: DISCONTINUED | OUTPATIENT
Start: 2019-02-12 | End: 2019-02-12

## 2019-02-12 RX ORDER — FERROUS SULFATE 325(65) MG
1 TABLET ORAL
Qty: 0 | Refills: 0 | COMMUNITY

## 2019-02-12 RX ORDER — FERROUS SULFATE 325(65) MG
325 TABLET ORAL DAILY
Qty: 0 | Refills: 0 | Status: DISCONTINUED | OUTPATIENT
Start: 2019-02-12 | End: 2019-02-13

## 2019-02-12 RX ORDER — DULOXETINE HYDROCHLORIDE 30 MG/1
1 CAPSULE, DELAYED RELEASE ORAL
Qty: 0 | Refills: 0 | COMMUNITY

## 2019-02-12 RX ORDER — AMLODIPINE BESYLATE 2.5 MG/1
1 TABLET ORAL
Qty: 0 | Refills: 0 | COMMUNITY

## 2019-02-12 RX ORDER — SODIUM CHLORIDE 9 MG/ML
1000 INJECTION, SOLUTION INTRAVENOUS
Qty: 0 | Refills: 0 | Status: DISCONTINUED | OUTPATIENT
Start: 2019-02-12 | End: 2019-02-13

## 2019-02-12 RX ORDER — CHLORHEXIDINE GLUCONATE 213 G/1000ML
15 SOLUTION TOPICAL
Qty: 0 | Refills: 0 | COMMUNITY

## 2019-02-12 RX ADMIN — Medication 100 MILLIGRAM(S): at 21:43

## 2019-02-12 RX ADMIN — Medication 600 MILLIGRAM(S): at 20:51

## 2019-02-12 RX ADMIN — ATORVASTATIN CALCIUM 40 MILLIGRAM(S): 80 TABLET, FILM COATED ORAL at 21:44

## 2019-02-12 RX ADMIN — CHLORHEXIDINE GLUCONATE 15 MILLILITER(S): 213 SOLUTION TOPICAL at 21:44

## 2019-02-12 RX ADMIN — Medication 600 MILLIGRAM(S): at 19:51

## 2019-02-12 RX ADMIN — DULOXETINE HYDROCHLORIDE 60 MILLIGRAM(S): 30 CAPSULE, DELAYED RELEASE ORAL at 21:43

## 2019-02-12 NOTE — H&P ADULT - NSHPREVIEWOFSYSTEMS_GEN_ALL_CORE
CONSTITUTIONAL: No fever, weight loss, fatigue  EYES: No eye pain, visual disturbances, or discharge  ENMT:  No difficulty hearing, tinnitus, vertigo; No sinus or throat pain; Pain w/ chewing in posterior right mandible  RESPIRATORY: No SOB, high pitch sounds on ins  CARDIOVASCULAR: No chest pain, palpitations, dizziness, no leg swelling  GASTROINTESTINAL: No abdominal pain, no N/V, no hematemesis; No diarrhea or constipation. No melena or hematochezia; Occasional nausea, hx of GERD  GENITOURINARY: No dysuria, frequency, hematuria, or incontinence  NEUROLOGICAL: No headaches, loss of strength, numbness, or tremors  SKIN: No itching, burning, rashes, or lesions   LYMPH NODES: No enlarged glands  ENDOCRINE: No heat or cold intolerance; No polydipsia or polyuria  MUSCULOSKELETAL: No joint swelling; back pain; arthritis  PSYCHIATRIC: Depression; No anxiety  HEME/LYMPH: No easy bruising, or bleeding gums

## 2019-02-12 NOTE — H&P ADULT - NSHPPHYSICALEXAM_GEN_ALL_CORE
HEAD:  Atraumatic, Normocephalic  EYES: EOMI, PERRLA, conjunctiva and sclera clear  ENMT: No tonsillar erythema, exudates, or enlargement; Moist mucous membranes, Good dentition, No lesions  NECK: Supple, No JVD, Normal thyroid

## 2019-02-12 NOTE — H&P ADULT - ATTENDING COMMENTS
Pt is a 62 yo male who present with chronic osteomyelitis of the right mandible following extraction of right mandibular molar 4 several months ago. Pt has been receiving IV ertipenum without improvement in symptoms or radiographic presentation. Recent CT scan show progression of osteolysis to the inferior border with risk of pathologic fracture. Plan today for exploration right mandible via an extra-oral approach for debridement and placement of reconstruction plate.  R/B reviewed with patient and consent obtained

## 2019-02-12 NOTE — H&P ADULT - HISTORY OF PRESENT ILLNESS
62 yo M w/ osteomyelitis of right mandible presents for debridement and placement of reconstruction plate w/ possible bone grafting via extraoral approach w/ Dr. Valladares in the OR under GA. Pt had right third molar extracted in October that hasn't healed, is PICC line and has been receiving IV ertapenem.

## 2019-02-13 ENCOUNTER — TRANSCRIPTION ENCOUNTER (OUTPATIENT)
Age: 64
End: 2019-02-13

## 2019-02-13 VITALS
TEMPERATURE: 98 F | OXYGEN SATURATION: 98 % | HEART RATE: 81 BPM | DIASTOLIC BLOOD PRESSURE: 88 MMHG | SYSTOLIC BLOOD PRESSURE: 150 MMHG | RESPIRATION RATE: 18 BRPM

## 2019-02-13 DIAGNOSIS — M27.2 INFLAMMATORY CONDITIONS OF JAWS: ICD-10-CM

## 2019-02-13 DIAGNOSIS — Z79.2 LONG TERM (CURRENT) USE OF ANTIBIOTICS: ICD-10-CM

## 2019-02-13 DIAGNOSIS — Z45.2 ENCOUNTER FOR ADJUSTMENT AND MANAGEMENT OF VASCULAR ACCESS DEVICE: ICD-10-CM

## 2019-02-13 LAB
CULTURE - ACID FAST SMEAR CONCENTRATED: SIGNIFICANT CHANGE UP
SPECIMEN SOURCE: SIGNIFICANT CHANGE UP

## 2019-02-13 PROCEDURE — 99254 IP/OBS CNSLTJ NEW/EST MOD 60: CPT

## 2019-02-13 RX ORDER — ERTAPENEM SODIUM 1 G/1
1 INJECTION, POWDER, LYOPHILIZED, FOR SOLUTION INTRAMUSCULAR; INTRAVENOUS
Qty: 50 | Refills: 0 | OUTPATIENT
Start: 2019-02-13 | End: 2019-03-26

## 2019-02-13 RX ORDER — CHLORHEXIDINE GLUCONATE 213 G/1000ML
1 SOLUTION TOPICAL ONCE
Qty: 0 | Refills: 0 | Status: COMPLETED | OUTPATIENT
Start: 2019-02-13 | End: 2019-02-13

## 2019-02-13 RX ADMIN — CHLORHEXIDINE GLUCONATE 1 APPLICATION(S): 213 SOLUTION TOPICAL at 10:00

## 2019-02-13 RX ADMIN — Medication 600 MILLIGRAM(S): at 00:25

## 2019-02-13 RX ADMIN — Medication 600 MILLIGRAM(S): at 06:10

## 2019-02-13 RX ADMIN — AMLODIPINE BESYLATE 5 MILLIGRAM(S): 2.5 TABLET ORAL at 06:10

## 2019-02-13 RX ADMIN — Medication 600 MILLIGRAM(S): at 01:25

## 2019-02-13 RX ADMIN — Medication 600 MILLIGRAM(S): at 07:10

## 2019-02-13 RX ADMIN — ERTAPENEM SODIUM 120 MILLIGRAM(S): 1 INJECTION, POWDER, LYOPHILIZED, FOR SOLUTION INTRAMUSCULAR; INTRAVENOUS at 06:11

## 2019-02-13 RX ADMIN — OXYCODONE HYDROCHLORIDE 5 MILLIGRAM(S): 5 TABLET ORAL at 00:25

## 2019-02-13 RX ADMIN — FAMOTIDINE 20 MILLIGRAM(S): 10 INJECTION INTRAVENOUS at 06:10

## 2019-02-13 RX ADMIN — OXYCODONE HYDROCHLORIDE 5 MILLIGRAM(S): 5 TABLET ORAL at 01:25

## 2019-02-13 RX ADMIN — CHLORHEXIDINE GLUCONATE 15 MILLILITER(S): 213 SOLUTION TOPICAL at 06:15

## 2019-02-13 RX ADMIN — Medication 600 MILLIGRAM(S): at 13:26

## 2019-02-13 NOTE — CONSULT NOTE ADULT - ATTENDING COMMENTS
Josias Yanez  Attending Physician   Division of Infectious Disease  Pager #[phone]  After 5pm/weekend or no response, call #[phone]

## 2019-02-13 NOTE — CONSULT NOTE ADULT - PROBLEM SELECTOR RECOMMENDATION 9
-s/p debridement + plate placement  -postop care per OMFS  -f/u in ID office in 5-6 weeks #[phone]  -stable, not septic

## 2019-02-13 NOTE — CONSULT NOTE ADULT - ASSESSMENT
62 yo M w/ osteomyelitis of right mandible, s/p debridement and placement of reconstruction plate on long term abx with PICC in place

## 2019-02-13 NOTE — DISCHARGE NOTE ADULT - HOSPITAL COURSE
63M s/p debridement of R. mandible, lateralization of R. inferior alveolar nerve, and placement of reconstruction plate. Pt transferred to PACU in stable condition. Pt recovered w/o issue, admitted overnight for monitoring, no issues overnight. Pt pain controlled on PO meds, tolerating PO, voiding, ambulating. Pt stable for discharge w/ outpatient follow up.

## 2019-02-13 NOTE — PROGRESS NOTE ADULT - SUBJECTIVE AND OBJECTIVE BOX
ANESTHESIA POSTOP CHECK    63y Male POSTOP DAY 1    Vital Signs Last 24 Hrs  T(C): 36.4 (13 Feb 2019 09:42), Max: 36.8 (12 Feb 2019 19:26)  T(F): 97.6 (13 Feb 2019 09:42), Max: 98.3 (12 Feb 2019 19:26)  HR: 81 (13 Feb 2019 09:42) (70 - 97)  BP: 150/88 (13 Feb 2019 09:42) (125/72 - 157/71)  BP(mean): 86 (12 Feb 2019 16:45) (83 - 91)  RR: 18 (13 Feb 2019 09:42) (13 - 21)  SpO2: 98% (13 Feb 2019 09:42) (95% - 100%)  I&O's Summary    12 Feb 2019 07:01  -  13 Feb 2019 07:00  --------------------------------------------------------  IN: 1200 mL / OUT: 1910 mL / NET: -710 mL        [x ] NO APPARENT ANESTHESIA COMPLICATIONS      Comments:

## 2019-02-13 NOTE — DISCHARGE NOTE ADULT - CARE PLAN
Principal Discharge DX:	Osteomyelitis  Goal:	Recover from surgery  Assessment and plan of treatment:	Continue medications, supportive care, and follow up appointments

## 2019-02-13 NOTE — PROGRESS NOTE ADULT - SUBJECTIVE AND OBJECTIVE BOX
GENERAL SURGERY PROGRESS NOTE    POST OPERATIVE DAY #:       SUBJECTIVE: Pt seen and examined at bedside.   Denies N/V, fever, chills, SOB, CP.     Vital Signs Last 24 Hrs  T(C): 36.8 (13 Feb 2019 06:06), Max: 36.8 (12 Feb 2019 19:26)  T(F): 98.3 (13 Feb 2019 06:06), Max: 98.3 (12 Feb 2019 19:26)  HR: 84 (13 Feb 2019 06:06) (70 - 103)  BP: 130/72 (13 Feb 2019 06:06) (125/72 - 157/71)  BP(mean): 86 (12 Feb 2019 16:45) (83 - 91)  RR: 18 (13 Feb 2019 06:06) (13 - 21)  SpO2: 98% (13 Feb 2019 06:06) (95% - 100%)    Physical Exam  General: awake, alert  Pulm: respirations unlabored, no increased WOB  Abdomen:   Extremities: Grossly symmetric    I&O's Summary    12 Feb 2019 07:01  -  13 Feb 2019 06:34  --------------------------------------------------------  IN: 720 mL / OUT: 910 mL / NET: -190 mL      I&O's Detail    12 Feb 2019 07:01  -  13 Feb 2019 06:34  --------------------------------------------------------  IN:    lactated ringers.: 720 mL  Total IN: 720 mL    OUT:    Indwelling Catheter - Urethral: 310 mL    Voided: 600 mL  Total OUT: 910 mL    Total NET: -190 mL          MEDICATIONS  (STANDING):  amLODIPine   Tablet 5 milliGRAM(s) Oral daily  atorvastatin 40 milliGRAM(s) Oral at bedtime  buPROPion XL . 150 milliGRAM(s) Oral daily  chlorhexidine 0.12% Liquid 15 milliLiter(s) Swish and Spit two times a day  DULoxetine 60 milliGRAM(s) Oral daily  ertapenem  IVPB 1000 milliGRAM(s) IV Intermittent every 24 hours  famotidine Injectable 20 milliGRAM(s) IV Push every 12 hours  ferrous    sulfate 325 milliGRAM(s) Oral daily  ibuprofen  Tablet. 600 milliGRAM(s) Oral every 6 hours  lactated ringers. 1000 milliLiter(s) (120 mL/Hr) IV Continuous <Continuous>  mirtazapine 30 milliGRAM(s) Oral once    MEDICATIONS  (PRN):  metoclopramide Injectable 10 milliGRAM(s) IV Push every 8 hours PRN nausea/vomiting  morphine  - Injectable 2 milliGRAM(s) IV Push every 6 hours PRN breakthrough pain  ondansetron Injectable 4 milliGRAM(s) IV Push every 6 hours PRN Nausea and/or Vomiting  oxyCODONE    IR 5 milliGRAM(s) Oral every 6 hours PRN Moderate Pain (4 - 6)  oxyCODONE    IR 10 milliGRAM(s) Oral every 6 hours PRN Severe Pain (7 - 10)      LABS:                RADIOLOGY & ADDITIONAL STUDIES: PROGRESS NOTE    POST OPERATIVE DAY #: 1      SUBJECTIVE: 1 day s/p debridement of right mandible and reconstruction plate placement. Pt seen and examined at bedside.   Denies N/V, fever, chills, SOB, CP. Pt reports tolerating PO, ambulating and voiding appropriately.    Vital Signs Last 24 Hrs  T(C): 36.8 (13 Feb 2019 06:06), Max: 36.8 (12 Feb 2019 19:26)  T(F): 98.3 (13 Feb 2019 06:06), Max: 98.3 (12 Feb 2019 19:26)  HR: 84 (13 Feb 2019 06:06) (70 - 103)  BP: 130/72 (13 Feb 2019 06:06) (125/72 - 157/71)  BP(mean): 86 (12 Feb 2019 16:45) (83 - 91)  RR: 18 (13 Feb 2019 06:06) (13 - 21)  SpO2: 98% (13 Feb 2019 06:06) (95% - 100%)    Physical Exam  General: awake, alert  Pulm: respirations unlabored, no increased WOB  EOE: Steri strips in place over incision site. No surrounding erythema.   Site hemostatic.    I&O's Summary    12 Feb 2019 07:01  -  13 Feb 2019 06:34  --------------------------------------------------------  IN: 720 mL / OUT: 910 mL / NET: -190 mL      I&O's Detail    12 Feb 2019 07:01  -  13 Feb 2019 06:34  --------------------------------------------------------  IN:    lactated ringers.: 720 mL  Total IN: 720 mL    OUT:    Indwelling Catheter - Urethral: 310 mL    Voided: 600 mL  Total OUT: 910 mL    Total NET: -190 mL          MEDICATIONS  (STANDING):  amLODIPine   Tablet 5 milliGRAM(s) Oral daily  atorvastatin 40 milliGRAM(s) Oral at bedtime  buPROPion XL . 150 milliGRAM(s) Oral daily  chlorhexidine 0.12% Liquid 15 milliLiter(s) Swish and Spit two times a day  DULoxetine 60 milliGRAM(s) Oral daily  ertapenem  IVPB 1000 milliGRAM(s) IV Intermittent every 24 hours  famotidine Injectable 20 milliGRAM(s) IV Push every 12 hours  ferrous    sulfate 325 milliGRAM(s) Oral daily  ibuprofen  Tablet. 600 milliGRAM(s) Oral every 6 hours  lactated ringers. 1000 milliLiter(s) (120 mL/Hr) IV Continuous <Continuous>  mirtazapine 30 milliGRAM(s) Oral once    MEDICATIONS  (PRN):  metoclopramide Injectable 10 milliGRAM(s) IV Push every 8 hours PRN nausea/vomiting  morphine  - Injectable 2 milliGRAM(s) IV Push every 6 hours PRN breakthrough pain  ondansetron Injectable 4 milliGRAM(s) IV Push every 6 hours PRN Nausea and/or Vomiting  oxyCODONE    IR 5 milliGRAM(s) Oral every 6 hours PRN Moderate Pain (4 - 6)  oxyCODONE    IR 10 milliGRAM(s) Oral every 6 hours PRN Severe Pain (7 - 10)          Plan: 63 year old male 1 day s/p debridement of right mandible and reconstruction plate placement. Recovering well    -Start ertapenem this morning  -Confirm VNS with SW  -Continue PO pain meds  -Encourage ambulation and PO intake  -D/c to home today      TARAS Montanez [id number]

## 2019-02-13 NOTE — CONSULT NOTE ADULT - PROBLEM SELECTOR RECOMMENDATION 3
-invanz 1 gm iv q24  -D/w Dr Valladares - given infected jaw and hardware placement favor 6 weeks iv abx  -check weekly cbc, cmp, esr, crp - fax [phone]  -D/w [employer] regarding above

## 2019-02-13 NOTE — DISCHARGE NOTE ADULT - INSTRUCTIONS
Remain on soft diet until further recommendations  No Heavy lifting/straining Watch for signs of infection; redness, swelling, fever, chills or heat, report such symptoms to the MD. No driving while taking pain medication, it causes drowsiness & constipation. Drink 6-8 glasses of fluids daily to promote hydration. No heavy lifting, pulling or pushing heavy objects. Follow up with the MD Apply ice on/off Q 20mins. Watch for signs of infection; redness, swelling, fever, chills or heat, report such symptoms to the MD. No driving while taking pain medication, it causes drowsiness & constipation. Drink 6-8 glasses of fluids daily to promote hydration. No heavy lifting, pulling or pushing heavy objects. Follow up with the MD

## 2019-02-13 NOTE — DISCHARGE NOTE ADULT - CONDITION (STATED IN TERMS THAT PERMIT A SPECIFIC MEASURABLE COMPARISON WITH CONDITION ON ADMISSION):
Alert & oriented. Out of bed ambulating. Tolerating clear diet and voiding adequately. VSS, afebrile.

## 2019-02-13 NOTE — DISCHARGE NOTE ADULT - PATIENT PORTAL LINK FT
You can access the [facility] Patient Portal, offered by [facility], by registering with the following website: [email or web address]

## 2019-02-13 NOTE — CONSULT NOTE ADULT - SUBJECTIVE AND OBJECTIVE BOX
WENDI NAQVI 63y Male  MRN-[id number]    Patient is a 63y old  Male who presents with a chief complaint of Debridement of mandible (13 Feb 2019 10:27)      HPI:  64 yo M w/ osteomyelitis of right mandible presents for debridement and placement of reconstruction plate w/ possible bone grafting via extraoral approach w/ Dr. Valladares in the OR under [facility]. Pt had right third molar extracted in October that hasn't healed, is PICC line and has been receiving IV ertapenem. (12 Feb 2019 08:40)    S/p OR 2/12 with debridement of right mandible and reconstruction plate placement    No complaint with picc.    Some pain today. Tolerating soft diet. No GI issues No fever.       PAST MEDICAL & SURGICAL HISTORY:  Anemia  Osteomyelitis: right jaw  Lung nodules: stable based on recent CT scan  Osteopenia  Hypertension  Hyperlipidemia  GERD (gastroesophageal reflux disease)  Depression  Degenerative disc disease, lumbar: &amp; thoracic  BPH (benign prostatic hyperplasia)  Lawler esophagus  H/O colonoscopy  Skin tag: muliple removals      Allergies    No Known Allergies    Intolerances        ANTIMICROBIALS:  ertapenem  IVPB 1000 every 24 hours      MEDICATIONS  (STANDING):  amLODIPine   Tablet 5 milliGRAM(s) Oral daily  atorvastatin 40 milliGRAM(s) Oral at bedtime  buPROPion XL . 150 milliGRAM(s) Oral daily  chlorhexidine 0.12% Liquid 15 milliLiter(s) Swish and Spit two times a day  DULoxetine 60 milliGRAM(s) Oral daily  ertapenem  IVPB 1000 milliGRAM(s) IV Intermittent every 24 hours  famotidine Injectable 20 milliGRAM(s) IV Push every 12 hours  ferrous    sulfate 325 milliGRAM(s) Oral daily  ibuprofen  Tablet. 600 milliGRAM(s) Oral every 6 hours  lactated ringers. 1000 milliLiter(s) (120 mL/Hr) IV Continuous <Continuous>  mirtazapine 30 milliGRAM(s) Oral once      Social History  Smoking: no  Etoh: no  Drug use: no      FAMILY HISTORY:  Mother -osteoporosis       Vital Signs Last 24 Hrs  T(C): 36.4 (13 Feb 2019 09:42), Max: 36.8 (12 Feb 2019 19:26)  T(F): 97.6 (13 Feb 2019 09:42), Max: 98.3 (12 Feb 2019 19:26)  HR: 81 (13 Feb 2019 09:42) (70 - 97)  BP: 150/88 (13 Feb 2019 09:42) (125/72 - 157/71)  BP(mean): 86 (12 Feb 2019 16:45) (83 - 91)  RR: 18 (13 Feb 2019 09:42) (13 - 21)  SpO2: 98% (13 Feb 2019 09:42) (95% - 100%)                  MICROBIOLOGY:    Culture - Surg Site Aerob/Anaer w/Gm St (02.12.19 @ 16:58)    Gram Stain Wound:   NOS^No Organisms Seen  WBC^White Blood Cells  QNTY CELLS IN GRAM STAIN: NO CELLS SEEN    Specimen Source: BONE    Culture - Surg Site Aerob/Anaer w/Gm St (02.12.19 @ 16:56)    Gram Stain Wound:   NOS^No Organisms Seen  WBC^White Blood Cells  QNTY CELLS IN GRAM STAIN: RARE (1+)    Specimen Source: BONE    Culture - Surg Site Aerob/Anaer w/Gm St (02.12.19 @ 16:51)    Gram Stain Wound:   NOS^No Organisms Seen  WBC^White Blood Cells  QNTY CELLS IN GRAM STAIN: NO CELLS SEEN    Specimen Source: BONE        RADIOLOGY  < from: CT Maxillofacial w/ IV Cont (12.26.18 @ 17:11) >  Mixed lucent/sclerotic changes within the mandible adjacent to an   unhealed right third molar extraction socket with juxta mandibular soft   tissue swelling and fat plane reticulation. Findings favor an   inflammatory process such as osteomyelitis. Enlargement of the right   masseter is compatible with associated myositis. The possibility of   osteonecrosis is raised in the appropriate clinical setting.    Prominent adjacent vessels raise the possibility of a vascular   malformation.

## 2019-02-13 NOTE — DISCHARGE NOTE ADULT - CONDITIONS AT DISCHARGE
Alert & oriented. Ice on/off Q 20mins. Out of bed ambulating. Tolerating clear diet without nausea or vomiting. Voiding without difficulty. Vitals stable, afebrile. Understands all discharge instruction & will follow up with the MD.

## 2019-02-13 NOTE — DISCHARGE NOTE ADULT - MEDICATION SUMMARY - MEDICATIONS TO TAKE
I will START or STAY ON the medications listed below when I get home from the hospital:    Vitamin D 1 cap orally daily  -- Indication: For As indicated    Garlic 1-4 tabs daily  last dose 2/4  -- Indication: For As indicated    Calcium  1-2 tabs orally daily  -- Indication: For As indicated    Calcium/Magnesium 1-2 tabs daily  -- Indication: For As indicated    Fish Oil 1000 mg  1-2 caps daily  last dose 2/4  -- Indication: For As indicated    oregano oil 2 drops daily  -- Last dose 02/04  -- Indication: For As indicated    collagen 1 tab daily  -- last dose 02/04  -- Indication: For As indicated    aspirin 81 mg oral tablet  -- 1 tab(s) by mouth once a day  last dose 2/4  -- Indication: For As indicated    terazosin 5 mg oral capsule  -- 1 cap(s) by mouth once a day (at bedtime)  -- Indication: For As indicated    LORazepam 0.5 mg oral tablet  -- 1 tab(s) by mouth 2 times a day  -- Indication: For As indicated    DULoxetine 60 mg oral delayed release capsule  -- 1 cap(s) by mouth 2 times a day  -- Indication: For As indicated    mirtazapine 30 mg oral tablet  -- 1 tab(s) by mouth once a day (at bedtime)  -- Indication: For As indicated    rosuvastatin 10 mg oral tablet  -- 1 tab(s) by mouth once a day (at bedtime)  -- Indication: For As indicated    chlorhexidine 0.12% mucous membrane liquid  -- 15 milliliter(s) mucous membrane 2 times a day  -- Indication: For As indicated    amLODIPine 5 mg oral tablet  -- 1 tab(s) by mouth once a day  -- Indication: For As indicated    ertapenem 1 g injection  -- injectable once a day  IV 1 gm in 100cc IV fluid at 200 mg/hour  -- Indication: For As indicated    clobetasol 0.05% topical lotion  -- Apply on skin to affected area 2 times a day  -- Indication: For As indicated    raNITIdine 150 mg oral capsule  -- 1 cap(s) by mouth 2 times a day  -- Indication: For As indicated    ferrous sulfate 325 mg (65 mg elemental iron) oral tablet  -- 1 tab(s) by mouth 2 times a day  -- Indication: For As indicated    Probiotic Formula oral capsule  -- 1 cap(s) by mouth once a day  -- Indication: For As indicated    buPROPion 300 mg/24 hours (XL) oral tablet, extended release  -- 1 tab(s) by mouth every 24 hours on Sun, Mon, Weds, Thurs, Sat  -- Indication: For As indicated    buPROPion 150 mg/24 hours (XL) oral tablet, extended release  -- 1 tab(s) by mouth every 24 hours on Tues and Fri  -- Indication: For As indicated

## 2019-02-14 LAB
SPECIMEN SOURCE: SIGNIFICANT CHANGE UP

## 2019-02-16 LAB — CULTURE - SURGICAL SITE: SIGNIFICANT CHANGE UP

## 2019-02-17 LAB
CULTURE - SURGICAL SITE: SIGNIFICANT CHANGE UP
CULTURE - SURGICAL SITE: SIGNIFICANT CHANGE UP

## 2019-02-19 LAB
SPECIMEN SOURCE: SIGNIFICANT CHANGE UP

## 2019-02-21 PROBLEM — N40.0 BENIGN PROSTATIC HYPERPLASIA WITHOUT LOWER URINARY TRACT SYMPTOMS: Chronic | Status: ACTIVE | Noted: 2019-01-30

## 2019-02-21 PROBLEM — E78.5 HYPERLIPIDEMIA, UNSPECIFIED: Chronic | Status: ACTIVE | Noted: 2019-01-30

## 2019-02-21 PROBLEM — R91.8 OTHER NONSPECIFIC ABNORMAL FINDING OF LUNG FIELD: Chronic | Status: ACTIVE | Noted: 2019-01-30

## 2019-02-21 PROBLEM — M85.80 OTHER SPECIFIED DISORDERS OF BONE DENSITY AND STRUCTURE, UNSPECIFIED SITE: Chronic | Status: ACTIVE | Noted: 2019-01-30

## 2019-02-21 PROBLEM — K22.70 BARRETT'S ESOPHAGUS WITHOUT DYSPLASIA: Chronic | Status: ACTIVE | Noted: 2019-01-30

## 2019-02-21 PROBLEM — M86.9 OSTEOMYELITIS, UNSPECIFIED: Chronic | Status: ACTIVE | Noted: 2019-01-30

## 2019-02-21 PROBLEM — I10 ESSENTIAL (PRIMARY) HYPERTENSION: Chronic | Status: ACTIVE | Noted: 2019-01-30

## 2019-02-21 PROBLEM — M51.36 OTHER INTERVERTEBRAL DISC DEGENERATION, LUMBAR REGION: Chronic | Status: ACTIVE | Noted: 2019-01-30

## 2019-02-21 PROBLEM — K21.9 GASTRO-ESOPHAGEAL REFLUX DISEASE WITHOUT ESOPHAGITIS: Chronic | Status: ACTIVE | Noted: 2019-01-30

## 2019-02-21 PROBLEM — F32.9 MAJOR DEPRESSIVE DISORDER, SINGLE EPISODE, UNSPECIFIED: Chronic | Status: ACTIVE | Noted: 2019-01-30

## 2019-02-21 PROBLEM — D64.9 ANEMIA, UNSPECIFIED: Chronic | Status: ACTIVE | Noted: 2019-01-30

## 2019-03-07 ENCOUNTER — TRANSCRIPTION ENCOUNTER (OUTPATIENT)
Age: 64
End: 2019-03-07

## 2019-03-12 NOTE — ASU PREOP CHECKLIST - IV STARTED
Called pt to confirm 8:30am arrival time for procedure. Gave pt NPO instructions and gave pt opportunity to ask questions. Pt verbalized understanding.    
yes

## 2019-03-13 LAB
FUNGUS SPEC QL CULT: SIGNIFICANT CHANGE UP

## 2019-03-26 ENCOUNTER — APPOINTMENT (OUTPATIENT)
Dept: INFECTIOUS DISEASE | Facility: CLINIC | Age: 64
End: 2019-03-26
Payer: COMMERCIAL

## 2019-03-26 VITALS
TEMPERATURE: 97.5 F | OXYGEN SATURATION: 95 % | BODY MASS INDEX: 29.89 KG/M2 | DIASTOLIC BLOOD PRESSURE: 74 MMHG | HEART RATE: 80 BPM | HEIGHT: 66 IN | SYSTOLIC BLOOD PRESSURE: 131 MMHG | WEIGHT: 186 LBS

## 2019-03-26 DIAGNOSIS — Z45.2 ENCOUNTER FOR ADJUSTMENT AND MANAGEMENT OF VASCULAR ACCESS DEVICE: ICD-10-CM

## 2019-03-26 DIAGNOSIS — M27.2 INFLAMMATORY CONDITIONS OF JAWS: ICD-10-CM

## 2019-03-26 LAB
ACID FAST STN SPEC: SIGNIFICANT CHANGE UP

## 2019-03-26 PROCEDURE — 99213 OFFICE O/P EST LOW 20 MIN: CPT

## 2019-03-26 RX ORDER — AMOXICILLIN AND CLAVULANATE POTASSIUM 875; 125 MG/1; MG/1
875-125 TABLET, COATED ORAL
Qty: 20 | Refills: 0 | Status: DISCONTINUED | COMMUNITY
Start: 2018-10-08

## 2019-03-26 RX ORDER — CEPHALEXIN 500 MG/1
500 CAPSULE ORAL
Qty: 12 | Refills: 0 | Status: DISCONTINUED | COMMUNITY
Start: 2018-11-16

## 2019-03-26 RX ORDER — DOXYCYCLINE HYCLATE 100 MG/1
100 TABLET ORAL
Qty: 28 | Refills: 0 | Status: DISCONTINUED | COMMUNITY
Start: 2018-12-12

## 2019-03-26 RX ORDER — FLUOCINONIDE 0.5 MG/G
0.05 GEL TOPICAL
Qty: 30 | Refills: 0 | Status: DISCONTINUED | COMMUNITY
Start: 2018-09-21

## 2019-03-26 RX ORDER — CHLORHEXIDINE GLUCONATE, 0.12% ORAL RINSE 1.2 MG/ML
0.12 SOLUTION DENTAL
Qty: 473 | Refills: 0 | Status: DISCONTINUED | COMMUNITY
Start: 2018-10-08

## 2019-04-01 NOTE — HISTORY OF PRESENT ILLNESS
[FreeTextEntry1] : 64 y/o male comes for f/u visit for Jaw OM/abscess. s/p right jaw  debridement and\par placement of reconstruction plate 2/15.\par \par He has received almost 6 weeks iv abx post op.\par \par He is doing better. No complaints with PICC line. \par \par No fever. Right side pain and swelling in jaw has improved.\par \par No nausea, vomiting, diarrhea, abd pain. No CP/SOB.

## 2019-04-01 NOTE — ASSESSMENT
[FreeTextEntry1] : 64 y/o male with right masseter infection/mandible OM comes for visit. \par \par He is on invanz and 6 weeks into therapy. DC abx tomorrow and DC picc also. \par \par Last CBC ok. ESR and CRP improved.\par \par D/w pt in detail.  \par \par Monitor for fever or worsening jaw swelling, etc. [Treatment Education] : treatment education [Treatment Adherence] : treatment adherence [Rx Dose / Side Effects] : Rx dose/side effects

## 2019-07-03 ENCOUNTER — APPOINTMENT (OUTPATIENT)
Dept: GASTROENTEROLOGY | Facility: CLINIC | Age: 64
End: 2019-07-03
Payer: MEDICARE

## 2019-07-03 VITALS
HEART RATE: 90 BPM | HEIGHT: 66 IN | BODY MASS INDEX: 29.89 KG/M2 | WEIGHT: 186 LBS | SYSTOLIC BLOOD PRESSURE: 150 MMHG | OXYGEN SATURATION: 98 % | DIASTOLIC BLOOD PRESSURE: 90 MMHG | RESPIRATION RATE: 16 BRPM

## 2019-07-03 PROCEDURE — 99202 OFFICE O/P NEW SF 15 MIN: CPT

## 2019-07-03 RX ORDER — LORAZEPAM 0.5 MG/1
0.5 TABLET ORAL
Qty: 120 | Refills: 0 | Status: ACTIVE | COMMUNITY
Start: 2019-06-20

## 2019-07-03 RX ORDER — CLOBETASOL PROPIONATE 0.05 G/100ML
0.05 LOTION TOPICAL
Qty: 118 | Refills: 0 | Status: DISCONTINUED | COMMUNITY
Start: 2018-11-19 | End: 2019-07-03

## 2019-07-03 RX ORDER — FINASTERIDE 1 MG/1
TABLET ORAL
Refills: 0 | Status: DISCONTINUED | COMMUNITY
End: 2019-07-03

## 2019-07-03 NOTE — ASSESSMENT
[FreeTextEntry1] : This is a 64-year-old man with history of iron deficiency anemia. Upper endoscopy and colonoscopy are unrevealing for source of his anemia. I explained to him the risks, alternatives and benefits the small bowel capsule to evaluate for small bowel source of his anemia or GI bleeding. Risk including but not limited to capsule Retention Which May Require Surgical Intervention, Avoidance of MRI, and Incomplete Small Bowel Capsule  Due to Variation in Intestinal Motility. Multiple Questions Were Answered. He Stated Understanding.

## 2019-07-03 NOTE — PHYSICAL EXAM
[General Appearance - Alert] : alert [Sclera] : the sclera and conjunctiva were normal [General Appearance - In No Acute Distress] : in no acute distress [Outer Ear] : the ears and nose were normal in appearance [Auscultation Breath Sounds / Voice Sounds] : lungs were clear to auscultation bilaterally [Heart Rate And Rhythm] : heart rate was normal and rhythm regular [Heart Sounds Gallop] : no gallops [Heart Sounds] : normal S1 and S2 [Murmurs] : no murmurs [Edema] : there was no peripheral edema [Heart Sounds Pericardial Friction Rub] : no pericardial rub [Bowel Sounds] : normal bowel sounds [Abdomen Soft] : soft [Abdomen Tenderness] : non-tender [Abdomen Mass (___ Cm)] : no abdominal mass palpated [] : no hepato-splenomegaly [Skin Color & Pigmentation] : normal skin color and pigmentation [No Focal Deficits] : no focal deficits

## 2019-07-03 NOTE — REASON FOR VISIT
[FreeTextEntry1] : capsule endoscopy for iron deficiency anemia [Consultation] : a consultation visit

## 2019-07-03 NOTE — HISTORY OF PRESENT ILLNESS
[FreeTextEntry1] : This is a 64-year-old man with history of osteomyelitis of the jaw, hypertension referred by Dr. Yahir Torres for consultation for iron deficiency anemia. He is also following a hematologist. He underwent both an upper endoscopy as well as a colonoscopy November 2018 reported with benign colon polyps and gastritis. He denies abdominal pain, nausea or vomiting. He denies dysphagia. He denies changes in bowel habits. On average she has daily bowel movements without effort. He denies rectal bleeding or melena. He denies weight loss. There is no prior history of bowel obstruction or bowel resection. No prior abdominal surgeries. He does not have an AICD or pacemaker.

## 2019-07-29 ENCOUNTER — APPOINTMENT (OUTPATIENT)
Dept: GASTROENTEROLOGY | Facility: CLINIC | Age: 64
End: 2019-07-29
Payer: MEDICARE

## 2019-07-29 PROCEDURE — 91110 GI TRC IMG INTRAL ESOPH-ILE: CPT

## 2019-07-29 NOTE — HISTORY OF PRESENT ILLNESS
[de-identified] : This is a pleasant 64 year year old male being seen for a procedure visit for small bowel capsule endoscopy.   \par \par I have reviewed the risks, benefits and alternatives of small bowel capsule endoscopy were discussed with the patient including missed lesions as well as capsule retention that could possibly result in bowel obstruction to necessitate surgical removal. The informed written consent was obtained prior to ingestion of the pill cam.  The patient ingested the small bowel capsule without difficult.  He tolerated the procedure well, without immediate complications.  Real time video shows capsule in to the stomach.  The patient instructed to contact the office with any questions or concerns. \par Informed patient if he will need  MRI within 1 month he need abdominal Xray to conform the capsule exit.\par \par Post capsule ingestion instructions were given to patient .\par \par \par \par

## 2019-07-30 ENCOUNTER — APPOINTMENT (OUTPATIENT)
Dept: OTOLARYNGOLOGY | Facility: CLINIC | Age: 64
End: 2019-07-30
Payer: MEDICARE

## 2019-07-30 VITALS
SYSTOLIC BLOOD PRESSURE: 126 MMHG | WEIGHT: 186 LBS | HEART RATE: 76 BPM | BODY MASS INDEX: 29.89 KG/M2 | DIASTOLIC BLOOD PRESSURE: 76 MMHG | HEIGHT: 66 IN

## 2019-07-30 DIAGNOSIS — R07.0 PAIN IN THROAT: ICD-10-CM

## 2019-07-30 DIAGNOSIS — K21.9 GASTRO-ESOPHAGEAL REFLUX DISEASE W/OUT ESOPHAGITIS: ICD-10-CM

## 2019-07-30 DIAGNOSIS — H90.12 CONDUCTIVE HEARING LOSS, UNILATERAL, LEFT EAR, WITH UNRESTRICTED HEARING ON THE CONTRALATERAL SIDE: ICD-10-CM

## 2019-07-30 DIAGNOSIS — H90.3 SENSORINEURAL HEARING LOSS, BILATERAL: ICD-10-CM

## 2019-07-30 DIAGNOSIS — J34.2 DEVIATED NASAL SEPTUM: ICD-10-CM

## 2019-07-30 DIAGNOSIS — H61.23 IMPACTED CERUMEN, BILATERAL: ICD-10-CM

## 2019-07-30 PROCEDURE — 31231 NASAL ENDOSCOPY DX: CPT

## 2019-07-30 PROCEDURE — 92567 TYMPANOMETRY: CPT

## 2019-07-30 PROCEDURE — 99204 OFFICE O/P NEW MOD 45 MIN: CPT | Mod: 25

## 2019-07-30 PROCEDURE — 92557 COMPREHENSIVE HEARING TEST: CPT

## 2019-07-30 PROCEDURE — 69210 REMOVE IMPACTED EAR WAX UNI: CPT

## 2019-07-30 NOTE — ASSESSMENT
[FreeTextEntry1] : Throat pain - no mucosal lesions seen, no masses on exam or CT scan, does have significant LPR\par will proceed to start lifestyle regiment to reduce overproduction of acid and reduce laryngeal reflux including avoiding caffein, alcohol, eating before bed, spicy and fatty foods, and head elevation at night etc. Handout detailing regiment also given\par add zantac to PPI\par Pt drinks multiple times a week, discussed cessation\par d/d includes dental/ osteo issue - will follow up with dental, can consider MRI to see if inflammation persists\par burning tongue/throat\par - sensitive teeth tooth paste, avoid over brushing and lots of mouthwash\par - ice chips\par - Acid, alcohol and spicy restrictions as well as LPR precautions\par  - B vitamins, iron (pt already on) and zinc \par \par Does have significant NSD but no maxillary inflation per the scan to explain roof of mouth sx, pt does not complain of any sx between the eyes\par \par \par ear pressure and hearing loss - possibly ETD, has b/l SNHL likely due to previous noise exposure\par  At this point clinically not severe and it does not significant limitation in function, discuses what to look for in regards to signs of worsening hearing loss that may require re-evaluation. Also discussed behavioral techniques and environmental controls for improving function . They will follow up as needed or if hearing gets worse.\par cerumen cleared\par \par I personally saw and examined WENDI NAQVI in detail. I spoke to MERCEDES Sawyer regarding the assessment and plan of care.  I preformed the procedures and I reviewed the above assessment and plan of care, and agree. I have made changes in changes in the body of the note where appropriate.\par \par \par

## 2019-07-30 NOTE — HISTORY OF PRESENT ILLNESS
[de-identified] : 63 y/o male referred by Dr. Ruggiero with 3 month history of intermittent mild pain or discomfort  of roof of mouth, back of throat and in both ears. Also intermittent itching and discomfort with swallowing in these same areas. Only triggering factor is salty pretzels, otherwise can't identify any particular cause- can occur randomly. CT neck 6/13/19 WNL. Dr. Ruggiero gave trial of Flonase then Astelin which did not help.\par Other pertinent history includes:\par Feb. 2019- osteomyelitis of right jaw after wisdom infection s/p debridement and fixation hardware and treated with IV abx x 6 weeks (6 weeks before and 6 weeks after)- resolved.\par April 2019- woke up with headache, nausea/vomting and left ear pain- dx with ear infection- treated with topical abx- resolved.\par + Hx of Lawler's esophagus- on Omeprazole daily\par \par No vocal changes, no difficulty swallowing. \par No fever or chills.\par \par Also with intermittent clogged feeling of left ear and mild decreased hearing AS. Hearing test May 2019 showed type A tymps AU and mild-moderate SNHL AU with conductive component at 1000 Hz AS.\par No drainage, tinnitus AU. No vertigo. [FreeTextEntry1] : \par \par \par

## 2019-07-30 NOTE — PROCEDURE
[FreeTextEntry3] : Procedure - Cerumen Removal. \par After informed verbal consent is obtained, cerumen is removed from the bilat. ear canal with an Alligator forceps and suction.  Normal appearing canal following removal.\par  [FreeTextEntry6] : Procedure performed: Nasal Endoscopy- Diagnostic\par Pre / post -procedure indication(s): nasal congestion\par Verbal and/or written consent obtained from patient\par Scope #: 9,  flexible fiber optic telescope used\par The scope was introduced in the nasal passage between the middle and inferior turbinates to exam the inferior portion of the middle meatus and the fontanelle, as well as the maxillary ostia.  Next, the scope was passed medically and posteriorly to the middle turbinates to examine the sphenoethmoid recess and the superior turbinate region.\par Upon visualization the finders are as follows:\par Nasal Septum: sigmoidal septal deviation, sharp right spur\par B/L: Mucosa: pink and moist, Mucous: scant, Polyp: not seen, Inferior Turbinate, Middle and Superior Turbinate: normal, Inferior Meatus: narrow, Middle Meatus: narrow, Super Meatus:normal, Sphenoethmoidal Recess: clear.  Eustachian tube clear. \par The patient tolerated the procedure well\par  [de-identified] : Procedure performed: laryngeal Endoscopy- Diagnostic\par Pre-op/post op indication: globus\par Verbal and/or written consent obtained from patient\par Scope #: 9, flexible fiber optic telescope used.\par Scope was introduced through the nose passed on the floor of the nose to the nasopharynx and then followed down the soft palate to the lower pharynx. The tongue Base, Larynx, Hypopharynx were examined. Base of tongue was symmetric, vallecular was clear, epiglottis was not deformed, Glottis with fully mobile vocal cords without lesions or masses. Visualized subglottis clear. Postcricoid area with erythema and edema. Pos intra-arytenoid bar. Severe reflux changes. No pooling of secretions, masses or lesions. Airway patent, no foreign body visualized. No glottic/supraglottic edema. True vocal cords, arytenoids, vestibular folds, ventricles, pyriform sinuses, and aryepiglottic folds appear normal bilaterally. Vocal cords mobile with good contact b/l.\par \par

## 2019-07-30 NOTE — CONSULT LETTER
[FreeTextEntry1] : Dear Dr. Ruggiero,\par I had the pleasure of evaluating your patient WENDI NAQVI  thank you for allowing us to participate in their care. please see full note detailing our visit below.\par If you have any questions, please do not hesitate to call me and I would be happy to discuss further. \par \par Ryder Lancaster M.D.\par Attending Physician,  \par Department of Otolaryngology - Head and Neck Surgery\par [facility] \par Office: [phone]\par Fax: [phone]\par

## 2019-07-31 ENCOUNTER — RESULT REVIEW (OUTPATIENT)
Age: 64
End: 2019-07-31

## 2019-08-27 ENCOUNTER — APPOINTMENT (OUTPATIENT)
Dept: GASTROENTEROLOGY | Facility: CLINIC | Age: 64
End: 2019-08-27
Payer: MEDICARE

## 2019-08-27 VITALS
TEMPERATURE: 98.1 F | OXYGEN SATURATION: 96 % | WEIGHT: 185 LBS | BODY MASS INDEX: 29.73 KG/M2 | HEIGHT: 66 IN | SYSTOLIC BLOOD PRESSURE: 156 MMHG | HEART RATE: 88 BPM | RESPIRATION RATE: 15 BRPM | DIASTOLIC BLOOD PRESSURE: 78 MMHG

## 2019-08-27 PROCEDURE — 99203 OFFICE O/P NEW LOW 30 MIN: CPT

## 2019-08-27 PROCEDURE — 99213 OFFICE O/P EST LOW 20 MIN: CPT

## 2019-08-27 NOTE — REVIEW OF SYSTEMS
[Fever] : no fever [Chills] : no chills [Chest Pain] : no chest pain [Lower Ext Edema] : no extremity edema [Wheezing] : no wheezing [Shortness Of Breath] : no shortness of breath [Abdominal Pain] : no abdominal pain [Cough] : no cough [Fainting] : no fainting [Dizziness] : no dizziness [Negative] : Eyes [FreeTextEntry7] : denies BRBPR or melena  [FreeTextEntry2] : weight stable

## 2019-08-27 NOTE — HISTORY OF PRESENT ILLNESS
[FreeTextEntry1] : Patient is a 64 male with history of osteomyelitis of jaw, HTN, HLD, he said he had routine blood work by his PCP and was told he has anemia, he followed up with a hematologist who diagnosed him with iron deficiency anemia (NOA) he denies any BRBPR or melena. He  reports he had an upper endoscopy and a colonoscopy by Dr Pink in 2018. The patient reported Lawler's esophagus and colon polyps per path  tubular adenoma.. Patient had capsule endoscopy dated 7/29/19 that reported a few non bleeding 'red' spots in the proximal small bowel . He has been referred to advanced endoscopy for balloon enteroscopy to further evaluate the capsule endoscopy findings

## 2019-08-27 NOTE — REASON FOR VISIT
[Initial Eval - Existing Diagnosis] : an initial evaluation of an existing diagnosis [FreeTextEntry1] : Iron deficiency anemia

## 2019-08-27 NOTE — ASSESSMENT
[FreeTextEntry1] : Patient  found to have anemia is a 64 male with history of osteomyelitis of jaw Feb. 2019 , HTN, HLD, he said he had routine blood work by his PCP and was told he has anemia, he followed up with a hematologist who diagnosed him with iron deficiency anemia (NOA) he denies any BRBPR or melena. He  reports he had an upper endoscopy and a colonoscopy by Dr Pink in 2018. The patient reported Lawler's esophagus and colon polyps per path  tubular adenoma.. Patient had capsule endoscopy dated 7/29/19 that reported a few non bleeding 'red' spots in the proximal small bowel . He has been referred to advanced endoscopy for balloon enteroscopy to further evaluate the capsule endoscopy findings.. His recent labs dated 3/14/19  report Hgb 11.8 and Hct 36   MCV 96   The risks ,benefits, alternatives of procedure were discussed and the patient educated about the procedure. Risks include, but are not limited to bleeding, infection, injury to internal organs, missed lesions, possible need for further procedures including surgery and the risk of anesthesia .Patient expressed understanding and agrees to proceed.\par \par

## 2019-09-13 ENCOUNTER — RESULT REVIEW (OUTPATIENT)
Age: 64
End: 2019-09-13

## 2019-09-13 ENCOUNTER — OUTPATIENT (OUTPATIENT)
Dept: OUTPATIENT SERVICES | Facility: HOSPITAL | Age: 64
LOS: 1 days | End: 2019-09-13
Payer: MEDICARE

## 2019-09-13 ENCOUNTER — APPOINTMENT (OUTPATIENT)
Dept: GASTROENTEROLOGY | Facility: HOSPITAL | Age: 64
End: 2019-09-13

## 2019-09-13 DIAGNOSIS — L91.8 OTHER HYPERTROPHIC DISORDERS OF THE SKIN: Chronic | ICD-10-CM

## 2019-09-13 DIAGNOSIS — D50.9 IRON DEFICIENCY ANEMIA, UNSPECIFIED: ICD-10-CM

## 2019-09-13 DIAGNOSIS — Z98.890 OTHER SPECIFIED POSTPROCEDURAL STATES: Chronic | ICD-10-CM

## 2019-09-13 PROCEDURE — 43239 EGD BIOPSY SINGLE/MULTIPLE: CPT | Mod: GC,59

## 2019-09-13 PROCEDURE — 44361 SMALL BOWEL ENDOSCOPY/BIOPSY: CPT

## 2019-09-13 PROCEDURE — 88312 SPECIAL STAINS GROUP 1: CPT | Mod: 26

## 2019-09-13 PROCEDURE — 88305 TISSUE EXAM BY PATHOLOGIST: CPT

## 2019-09-13 PROCEDURE — 88305 TISSUE EXAM BY PATHOLOGIST: CPT | Mod: 26

## 2019-09-13 PROCEDURE — 44369 SMALL BOWEL ENDOSCOPY: CPT | Mod: GC

## 2019-09-13 PROCEDURE — 88312 SPECIAL STAINS GROUP 1: CPT

## 2019-09-13 PROCEDURE — 44366 SMALL BOWEL ENDOSCOPY: CPT | Mod: XS

## 2019-09-17 LAB — SURGICAL PATHOLOGY STUDY: SIGNIFICANT CHANGE UP

## 2019-10-02 PROBLEM — Z60.2 PERSON LIVING ALONE: Status: ACTIVE | Noted: 2018-05-22

## 2021-01-07 ENCOUNTER — OUTPATIENT (OUTPATIENT)
Dept: OUTPATIENT SERVICES | Facility: HOSPITAL | Age: 66
LOS: 1 days | End: 2021-01-07
Payer: MEDICARE

## 2021-01-07 DIAGNOSIS — Z20.828 CONTACT WITH AND (SUSPECTED) EXPOSURE TO OTHER VIRAL COMMUNICABLE DISEASES: ICD-10-CM

## 2021-01-07 DIAGNOSIS — L91.8 OTHER HYPERTROPHIC DISORDERS OF THE SKIN: Chronic | ICD-10-CM

## 2021-01-07 DIAGNOSIS — Z98.890 OTHER SPECIFIED POSTPROCEDURAL STATES: Chronic | ICD-10-CM

## 2021-01-07 LAB — SARS-COV-2 RNA SPEC QL NAA+PROBE: SIGNIFICANT CHANGE UP

## 2021-01-07 PROCEDURE — C9803: CPT

## 2021-01-07 PROCEDURE — U0005: CPT

## 2021-01-07 PROCEDURE — U0003: CPT

## 2021-01-08 DIAGNOSIS — Z20.828 CONTACT WITH AND (SUSPECTED) EXPOSURE TO OTHER VIRAL COMMUNICABLE DISEASES: ICD-10-CM

## 2021-02-04 ENCOUNTER — TRANSCRIPTION ENCOUNTER (OUTPATIENT)
Age: 66
End: 2021-02-04

## 2021-07-01 ENCOUNTER — OUTPATIENT (OUTPATIENT)
Dept: OUTPATIENT SERVICES | Facility: HOSPITAL | Age: 66
LOS: 1 days | Discharge: HOME | End: 2021-07-01
Payer: MEDICARE

## 2021-07-01 DIAGNOSIS — Z98.890 OTHER SPECIFIED POSTPROCEDURAL STATES: Chronic | ICD-10-CM

## 2021-07-01 DIAGNOSIS — D64.9 ANEMIA, UNSPECIFIED: ICD-10-CM

## 2021-07-01 DIAGNOSIS — L91.8 OTHER HYPERTROPHIC DISORDERS OF THE SKIN: Chronic | ICD-10-CM

## 2021-07-02 ENCOUNTER — RESULT REVIEW (OUTPATIENT)
Age: 66
End: 2021-07-02

## 2021-07-02 ENCOUNTER — APPOINTMENT (OUTPATIENT)
Dept: HEMATOLOGY ONCOLOGY | Facility: CLINIC | Age: 66
End: 2021-07-02
Payer: MEDICARE

## 2021-07-02 VITALS
BODY MASS INDEX: 29.55 KG/M2 | WEIGHT: 183.84 LBS | HEIGHT: 65.98 IN | RESPIRATION RATE: 17 BRPM | SYSTOLIC BLOOD PRESSURE: 157 MMHG | DIASTOLIC BLOOD PRESSURE: 82 MMHG | HEART RATE: 78 BPM | OXYGEN SATURATION: 98 % | TEMPERATURE: 97.7 F

## 2021-07-02 DIAGNOSIS — I10 ESSENTIAL (PRIMARY) HYPERTENSION: ICD-10-CM

## 2021-07-02 DIAGNOSIS — Z86.010 PERSONAL HISTORY OF COLONIC POLYPS: ICD-10-CM

## 2021-07-02 DIAGNOSIS — Z87.19 PERSONAL HISTORY OF OTHER DISEASES OF THE DIGESTIVE SYSTEM: ICD-10-CM

## 2021-07-02 DIAGNOSIS — K29.80 DUODENITIS W/OUT BLEEDING: ICD-10-CM

## 2021-07-02 DIAGNOSIS — K55.20 ANGIODYSPLASIA OF COLON W/OUT HEMORRHAGE: ICD-10-CM

## 2021-07-02 DIAGNOSIS — L28.1 PRURIGO NODULARIS: ICD-10-CM

## 2021-07-02 DIAGNOSIS — Z87.891 PERSONAL HISTORY OF NICOTINE DEPENDENCE: ICD-10-CM

## 2021-07-02 LAB
BASOPHILS # BLD AUTO: 0 K/UL — SIGNIFICANT CHANGE UP (ref 0–0.2)
BASOPHILS NFR BLD AUTO: 0 % — SIGNIFICANT CHANGE UP (ref 0–2)
EOSINOPHIL # BLD AUTO: 0.03 K/UL — SIGNIFICANT CHANGE UP (ref 0–0.5)
EOSINOPHIL NFR BLD AUTO: 1 % — SIGNIFICANT CHANGE UP (ref 0–6)
HCT VFR BLD CALC: 34.6 % — LOW (ref 39–50)
HGB BLD-MCNC: 11.6 G/DL — LOW (ref 13–17)
LYMPHOCYTES # BLD AUTO: 1.33 K/UL — SIGNIFICANT CHANGE UP (ref 1–3.3)
LYMPHOCYTES # BLD AUTO: 48 % — HIGH (ref 13–44)
MCHC RBC-ENTMCNC: 32 PG — SIGNIFICANT CHANGE UP (ref 27–34)
MCHC RBC-ENTMCNC: 33.5 G/DL — SIGNIFICANT CHANGE UP (ref 32–36)
MCV RBC AUTO: 95.3 FL — SIGNIFICANT CHANGE UP (ref 80–100)
MONOCYTES # BLD AUTO: 0.33 K/UL — SIGNIFICANT CHANGE UP (ref 0–0.9)
MONOCYTES NFR BLD AUTO: 12 % — SIGNIFICANT CHANGE UP (ref 2–14)
NEUTROPHILS # BLD AUTO: 1.08 K/UL — LOW (ref 1.8–7.4)
NEUTROPHILS NFR BLD AUTO: 39 % — LOW (ref 43–77)
NRBC # BLD: 0 /100 — SIGNIFICANT CHANGE UP (ref 0–0)
NRBC # BLD: SIGNIFICANT CHANGE UP /100 WBCS (ref 0–0)
PLAT MORPH BLD: NORMAL — SIGNIFICANT CHANGE UP
PLATELET # BLD AUTO: 180 K/UL — SIGNIFICANT CHANGE UP (ref 150–400)
RBC # BLD: 3.63 M/UL — LOW (ref 4.2–5.8)
RBC # FLD: 16.5 % — HIGH (ref 10.3–14.5)
RBC BLD AUTO: SIGNIFICANT CHANGE UP
WBC # BLD: 2.78 K/UL — LOW (ref 3.8–10.5)
WBC # FLD AUTO: 2.78 K/UL — LOW (ref 3.8–10.5)

## 2021-07-02 PROCEDURE — G2212 PROLONG OUTPT/OFFICE VIS: CPT

## 2021-07-02 PROCEDURE — 86334 IMMUNOFIX E-PHORESIS SERUM: CPT | Mod: 26

## 2021-07-02 PROCEDURE — 99205 OFFICE O/P NEW HI 60 MIN: CPT

## 2021-07-03 LAB
ALBUMIN SERPL ELPH-MCNC: 4.7 G/DL
ALP BLD-CCNC: 73 U/L
ALT SERPL-CCNC: 16 U/L
ANION GAP SERPL CALC-SCNC: 13 MMOL/L
AST SERPL-CCNC: 25 U/L
BILIRUB SERPL-MCNC: 0.2 MG/DL
BUN SERPL-MCNC: 20 MG/DL
CALCIUM SERPL-MCNC: 9.8 MG/DL
CHLORIDE SERPL-SCNC: 103 MMOL/L
CO2 SERPL-SCNC: 24 MMOL/L
CREAT SERPL-MCNC: 1.14 MG/DL
DEPRECATED KAPPA LC FREE/LAMBDA SER: 1.36 RATIO
FERRITIN SERPL-MCNC: 113 NG/ML
GLUCOSE SERPL-MCNC: 111 MG/DL
IRON SATN MFR SERPL: 32 %
IRON SERPL-MCNC: 90 UG/DL
KAPPA LC CSF-MCNC: 1.35 MG/DL
KAPPA LC SERPL-MCNC: 1.83 MG/DL
LDH SERPL-CCNC: 222 U/L
POTASSIUM SERPL-SCNC: 4.5 MMOL/L
PROT SERPL-MCNC: 7.5 G/DL
SODIUM SERPL-SCNC: 140 MMOL/L
TIBC SERPL-MCNC: 281 UG/DL
UIBC SERPL-MCNC: 191 UG/DL

## 2021-07-04 LAB
PROT SERPL-MCNC: 7.6 G/DL — SIGNIFICANT CHANGE UP (ref 6–8.3)
PROT SERPL-MCNC: 7.6 G/DL — SIGNIFICANT CHANGE UP (ref 6–8.3)

## 2021-07-08 PROBLEM — I10 HYPERTENSION: Status: ACTIVE | Noted: 2021-07-08

## 2021-07-08 PROBLEM — K55.20 ARTERIOVENOUS MALFORMATION OF JEJUNUM: Status: RESOLVED | Noted: 2021-07-08 | Resolved: 2021-07-08

## 2021-07-08 PROBLEM — L28.1 PRURIGO NODULARIS: Status: ACTIVE | Noted: 2021-07-08

## 2021-07-08 PROBLEM — Z86.010 HISTORY OF COLON POLYPS: Status: RESOLVED | Noted: 2021-07-08 | Resolved: 2021-07-08

## 2021-07-08 PROBLEM — Z87.19 HISTORY OF GASTRIC ULCER: Status: RESOLVED | Noted: 2021-07-08 | Resolved: 2021-07-08

## 2021-07-08 PROBLEM — K29.80 DUODENITIS: Status: RESOLVED | Noted: 2021-07-08 | Resolved: 2021-07-08

## 2021-07-08 PROBLEM — Z87.891 FORMER SMOKER: Status: ACTIVE | Noted: 2021-07-08

## 2021-07-08 RX ORDER — B-COMPLEX WITH VITAMIN C
100 TABLET ORAL DAILY
Qty: 90 | Refills: 0 | Status: DISCONTINUED | COMMUNITY
Start: 2019-07-30 | End: 2021-07-08

## 2021-07-08 RX ORDER — OMEPRAZOLE 20 MG/1
TABLET, DELAYED RELEASE ORAL
Refills: 0 | Status: DISCONTINUED | COMMUNITY
End: 2021-07-08

## 2021-07-08 RX ORDER — ROSUVASTATIN CALCIUM 10 MG/1
10 TABLET, FILM COATED ORAL DAILY
Refills: 0 | Status: ACTIVE | COMMUNITY

## 2021-07-08 RX ORDER — ASPIRIN 81 MG/1
81 TABLET, CHEWABLE ORAL
Refills: 0 | Status: DISCONTINUED | COMMUNITY
End: 2021-07-08

## 2021-07-08 RX ORDER — B-COMPLEX WITH VITAMIN C
TABLET ORAL DAILY
Qty: 90 | Refills: 0 | Status: DISCONTINUED | COMMUNITY
Start: 2019-07-30 | End: 2021-07-08

## 2021-07-08 RX ORDER — TERAZOSIN HCL 5 MG
5 TABLET ORAL DAILY
Refills: 0 | Status: ACTIVE | COMMUNITY

## 2021-07-08 RX ORDER — AMLODIPINE BESYLATE 10 MG/1
10 TABLET ORAL DAILY
Refills: 0 | Status: ACTIVE | COMMUNITY
Start: 2018-12-06

## 2021-07-08 RX ORDER — DULOXETINE HYDROCHLORIDE 60 MG/1
60 CAPSULE, DELAYED RELEASE PELLETS ORAL TWICE DAILY
Refills: 0 | Status: ACTIVE | COMMUNITY

## 2021-07-08 RX ORDER — ASPIRIN 81 MG
81 TABLET, DELAYED RELEASE (ENTERIC COATED) ORAL
Refills: 0 | Status: DISCONTINUED | COMMUNITY
End: 2021-07-08

## 2021-07-08 RX ORDER — BUPROPION HYDROCHLORIDE 300 MG/1
300 TABLET, EXTENDED RELEASE ORAL
Qty: 90 | Refills: 0 | Status: DISCONTINUED | COMMUNITY
Start: 2018-12-06 | End: 2021-07-08

## 2021-07-08 RX ORDER — RANITIDINE 150 MG/1
150 TABLET ORAL
Refills: 0 | Status: DISCONTINUED | COMMUNITY
End: 2021-07-08

## 2021-07-08 RX ORDER — RANITIDINE 150 MG/1
150 TABLET ORAL
Qty: 90 | Refills: 3 | Status: DISCONTINUED | COMMUNITY
Start: 2019-07-30 | End: 2021-07-08

## 2021-07-08 RX ORDER — FERROUS SULFATE 325(65) MG
325 TABLET ORAL
Refills: 0 | Status: DISCONTINUED | COMMUNITY
End: 2021-07-08

## 2021-07-08 RX ORDER — BUPROPION HYDROCHLORIDE 150 MG/1
150 TABLET, EXTENDED RELEASE ORAL
Qty: 90 | Refills: 0 | Status: DISCONTINUED | COMMUNITY
Start: 2018-10-12 | End: 2021-07-08

## 2021-07-08 NOTE — RESULTS/DATA
[FreeTextEntry1] : 7/2/21\par WBC 2780 Hgb 11.6 Hct 34.6 MCV 95.3 Platelets 180,000 Diff 39P 48L 12M 1Eos  ANC 1080\par Smear: Adequate platelets. NC/NC. WBC normal.

## 2021-07-08 NOTE — REASON FOR VISIT
[Initial Consultation] : an initial consultation for [Monoclonal Gammopathy] : monoclonal gammopathy [FreeTextEntry2] : Anemia

## 2021-07-08 NOTE — REVIEW OF SYSTEMS
[Negative] : Allergic/Immunologic [Joint Pain] : joint pain [Patient Intake Form Reviewed] : Patient intake form was reviewed

## 2021-07-08 NOTE — ADDENDUM
[FreeTextEntry1] : I, Rustam Lorraine, acted solely as a scribe for Dr. Ziggy Alcantara on 07/02/2021. All medical entries made by the Scribe were at my, Dr. Ziggy Alcantara's, direction and personally dictated by me on 07/02/2021. I have reviewed the chart and agree that the record accurately reflects my personal performance of the history, physical exam, assessment and plan. I have also personally directed, reviewed, and agreed with the chart.

## 2021-07-08 NOTE — CONSULT LETTER
[Dear  ___] : Dear  [unfilled], [Consult Letter:] : I had the pleasure of evaluating your patient, [unfilled]. [Please see my note below.] : Please see my note below. [Consult Closing:] : Thank you very much for allowing me to participate in the care of this patient.  If you have any questions, please do not hesitate to contact me. [Sincerely,] : Sincerely, [FreeTextEntry2] : William Guerrero MD [FreeTextEntry3] : Ziggy Alcantara M.D., FACP\par Professor of Medicine\par [employer] [facility] [employer]\par Associate Chief, Division of Hematology\par [facility]\par [facility]\par [address] [place] [address]\[place] [address]\par [phone]  [DrFady  ___] : Dr. GORDON

## 2021-07-08 NOTE — ASSESSMENT
[Palliative Care Plan] : not applicable at this time [FreeTextEntry1] : 66 year old male with a history of iron deficiency anemia in the setting of gastritis, gastric ulcers, duodenitis, multiple colonic polyps, and jejunal arteriovenous malformations who was also found to have an IgG lambda monoclonal gammopathy which appears to have spontaneously resolved and mild leukopenia with neutropenia. He has a history of heavy alcohol intake. Alcohol is a bone marrow toxin and, in addition to possibly causing some of his upper gastrointestinal lesions, may have suppressed his blood counts. He also has a history of exposure to organic solvents which increases his risk for the development of myelodysplastic syndrome. His relative lymphocytosis also raises the possibility of a lymphoproliferative disorder. I reviewed these possibilities with the patient. All questions were answered.\par \par Plan:\par Strongly recommended he decrease his alcohol intake to one or two drinks nightly at this time. \par CMP, LDH, SPEP, SPIEP, Quant Ig, SFLC, Iron/TIBC, ferritin\par Flow cytometry\par Consider bone marrow examination\par Call me in two weeks \par RTC 1 month

## 2021-07-08 NOTE — PHYSICAL EXAM
[Fully active, able to carry on all pre-disease performance without restriction] : Status 0 - Fully active, able to carry on all pre-disease performance without restriction [Normal] : pharynx is unremarkable, moist mucus membrane, no oral lesions [de-identified] : Bilateral arcus senilis [de-identified] : Benign testes [de-identified] : Multiple brown 5 x 5 mm nodules on scalp (chronic, stable)

## 2021-07-08 NOTE — HISTORY OF PRESENT ILLNESS
[Disease:__________________________] : Disease: [unfilled] [de-identified] : MGUS IgG lambda\par Leukopenia [de-identified] : 66 year old male referred in consultation regarding anemia. In December 2018, he was noted to be anemic with ferritin 14 and decreased iron saturation. Serum protein immunoelectrophoresis demonstrated the presence of a faint band of IgG lambda monoclonal gammopathy. Panendoscopy revealed duodenitis and colon polyps. He was begun on oral iron supplementation and his hgb teresa to 13. The iron was discontinued in April 2019. By July 2019, his hgb had fallen to 10.9. Iron was re-started and he responded. Repeat serum immunoelectrophoresis was negative. In September 2019, enteroscopy revealed two jejunal arteriovenous malformations which were cauterized, gastritis, and a gastric ulcer. In January 2020, hgb was 11.6. SPIEP was still negative. In March 2020, hgb 10.8, CMP normal, , iron sat 16%, ferritin 15, retics 2%, Liz negative, haptoglobin 100, B12 and folate normal. In March 2021, white count was 2500, hgb 12.5, hct 36.9, platelets 205,000, with 21% neutrophils, 51% lymphocytes, 24% monocytes, and absolute neutrophil count 528. Review of his records reveals that between December 2018 and March 2021, his white count has varied between 2500 and 4300, ANC between 500 and 2800, and hgb between 10.3 and 13. Platelets have always been normal. MCV increased from 89.3 to 101.1. \par \par He feels well. He notes no fever, night sweats, swollen glands, weight loss, headache, visual problems, chest pain, shortness of breath, abdominal pain, bleeding, bruising, melena, rectal bleeding, hematuria, rash, skeletal pain, jaundice, dark urine.\par \par He reports drinking three to four alcoholic drinks every evening, either beer or vodka. He was a  with exposure to organic solvents including acetone and benzene.

## 2021-07-14 LAB
% ALBUMIN: 58 % — SIGNIFICANT CHANGE UP
% ALPHA 1: 3.7 % — SIGNIFICANT CHANGE UP
% ALPHA 2: 9 % — SIGNIFICANT CHANGE UP
% BETA: 12.4 % — SIGNIFICANT CHANGE UP
% GAMMA: 16.9 % — SIGNIFICANT CHANGE UP
% M SPIKE: SIGNIFICANT CHANGE UP
ALBUMIN SERPL ELPH-MCNC: 4.4 G/DL — SIGNIFICANT CHANGE UP (ref 3.6–5.5)
ALBUMIN/GLOB SERPL ELPH: 1.4 RATIO — SIGNIFICANT CHANGE UP
ALPHA1 GLOB SERPL ELPH-MCNC: 0.3 G/DL — SIGNIFICANT CHANGE UP (ref 0.1–0.4)
ALPHA2 GLOB SERPL ELPH-MCNC: 0.7 G/DL — SIGNIFICANT CHANGE UP (ref 0.5–1)
B-GLOBULIN SERPL ELPH-MCNC: 0.9 G/DL — SIGNIFICANT CHANGE UP (ref 0.5–1)
GAMMA GLOBULIN: 1.3 G/DL — SIGNIFICANT CHANGE UP (ref 0.6–1.6)
INTERPRETATION SERPL IFE-IMP: SIGNIFICANT CHANGE UP
M-SPIKE: SIGNIFICANT CHANGE UP (ref 0–0)
PROT PATTERN SERPL ELPH-IMP: SIGNIFICANT CHANGE UP

## 2021-07-15 LAB
ALBUMIN MFR SERPL ELPH: 58.4 %
ALBUMIN SERPL-MCNC: 4.4 G/DL
ALBUMIN/GLOB SERPL: 1.4 RATIO
ALPHA1 GLOB MFR SERPL ELPH: 3.5 %
ALPHA1 GLOB SERPL ELPH-MCNC: 0.3 G/DL
ALPHA2 GLOB MFR SERPL ELPH: 9 %
ALPHA2 GLOB SERPL ELPH-MCNC: 0.7 G/DL
B-GLOBULIN MFR SERPL ELPH: 12.1 %
B-GLOBULIN SERPL ELPH-MCNC: 0.9 G/DL
DEPRECATED KAPPA LC FREE/LAMBDA SER: 1.36 RATIO
GAMMA GLOB FLD ELPH-MCNC: 1.3 G/DL
GAMMA GLOB MFR SERPL ELPH: 17 %
IGA SER QL IEP: 329 MG/DL
IGG SER QL IEP: 1186 MG/DL
IGM SER QL IEP: 104 MG/DL
INTERPRETATION SERPL IEP-IMP: NORMAL
KAPPA LC CSF-MCNC: 1.35 MG/DL
KAPPA LC SERPL-MCNC: 1.83 MG/DL
M PROTEIN MFR SERPL ELPH: NORMAL
M PROTEIN SPEC IFE-MCNC: NORMAL
MONOCLON BAND OBS SERPL: NORMAL
PROT SERPL-MCNC: 7.5 G/DL
PROT SERPL-MCNC: 7.5 G/DL

## 2021-07-30 ENCOUNTER — OUTPATIENT (OUTPATIENT)
Dept: OUTPATIENT SERVICES | Facility: HOSPITAL | Age: 66
LOS: 1 days | Discharge: HOME | End: 2021-07-30

## 2021-07-30 DIAGNOSIS — Z98.890 OTHER SPECIFIED POSTPROCEDURAL STATES: Chronic | ICD-10-CM

## 2021-07-30 DIAGNOSIS — D64.9 ANEMIA, UNSPECIFIED: ICD-10-CM

## 2021-07-30 DIAGNOSIS — L91.8 OTHER HYPERTROPHIC DISORDERS OF THE SKIN: Chronic | ICD-10-CM

## 2021-08-03 ENCOUNTER — RESULT REVIEW (OUTPATIENT)
Age: 66
End: 2021-08-03

## 2021-08-03 ENCOUNTER — APPOINTMENT (OUTPATIENT)
Dept: HEMATOLOGY ONCOLOGY | Facility: CLINIC | Age: 66
End: 2021-08-03
Payer: MEDICARE

## 2021-08-03 VITALS
SYSTOLIC BLOOD PRESSURE: 164 MMHG | OXYGEN SATURATION: 98 % | BODY MASS INDEX: 30.15 KG/M2 | WEIGHT: 187.61 LBS | DIASTOLIC BLOOD PRESSURE: 76 MMHG | HEIGHT: 66.14 IN | TEMPERATURE: 97.4 F | RESPIRATION RATE: 17 BRPM | HEART RATE: 88 BPM

## 2021-08-03 LAB
BASOPHILS # BLD AUTO: 0.04 K/UL — SIGNIFICANT CHANGE UP (ref 0–0.2)
BASOPHILS NFR BLD AUTO: 1 % — SIGNIFICANT CHANGE UP (ref 0–2)
EOSINOPHIL # BLD AUTO: 0.11 K/UL — SIGNIFICANT CHANGE UP (ref 0–0.5)
EOSINOPHIL NFR BLD AUTO: 3 % — SIGNIFICANT CHANGE UP (ref 0–6)
HCT VFR BLD CALC: 35.5 % — LOW (ref 39–50)
HGB BLD-MCNC: 11.7 G/DL — LOW (ref 13–17)
LYMPHOCYTES # BLD AUTO: 1.75 K/UL — SIGNIFICANT CHANGE UP (ref 1–3.3)
LYMPHOCYTES # BLD AUTO: 47 % — HIGH (ref 13–44)
MCHC RBC-ENTMCNC: 32.1 PG — SIGNIFICANT CHANGE UP (ref 27–34)
MCHC RBC-ENTMCNC: 33 G/DL — SIGNIFICANT CHANGE UP (ref 32–36)
MCV RBC AUTO: 97.5 FL — SIGNIFICANT CHANGE UP (ref 80–100)
MONOCYTES # BLD AUTO: 0.67 K/UL — SIGNIFICANT CHANGE UP (ref 0–0.9)
MONOCYTES NFR BLD AUTO: 18 % — HIGH (ref 2–14)
NEUTROPHILS # BLD AUTO: 1.12 K/UL — LOW (ref 1.8–7.4)
NEUTROPHILS NFR BLD AUTO: 30 % — LOW (ref 43–77)
NRBC # BLD: 0 /100 — SIGNIFICANT CHANGE UP (ref 0–0)
NRBC # BLD: SIGNIFICANT CHANGE UP /100 WBCS (ref 0–0)
PLAT MORPH BLD: NORMAL — SIGNIFICANT CHANGE UP
PLATELET # BLD AUTO: 165 K/UL — SIGNIFICANT CHANGE UP (ref 150–400)
RBC # BLD: 3.64 M/UL — LOW (ref 4.2–5.8)
RBC # FLD: 16.3 % — HIGH (ref 10.3–14.5)
RBC BLD AUTO: SIGNIFICANT CHANGE UP
VARIANT LYMPHS # BLD: 1 % — SIGNIFICANT CHANGE UP (ref 0–6)
WBC # BLD: 3.73 K/UL — LOW (ref 3.8–10.5)
WBC # FLD AUTO: 3.73 K/UL — LOW (ref 3.8–10.5)

## 2021-08-03 PROCEDURE — 99213 OFFICE O/P EST LOW 20 MIN: CPT

## 2021-08-03 RX ORDER — OMEPRAZOLE 20 MG/1
20 CAPSULE, DELAYED RELEASE ORAL DAILY
Qty: 1 | Refills: 5 | Status: ACTIVE | COMMUNITY
Start: 2021-08-03

## 2021-08-03 NOTE — PHYSICAL EXAM
[Fully active, able to carry on all pre-disease performance without restriction] : Status 0 - Fully active, able to carry on all pre-disease performance without restriction [Normal] : affect appropriate [de-identified] : Bilateral arcus senilis [de-identified] : Benign testes [de-identified] : Multiple brown 5 x 5 mm nodules on scalp (chronic, stable)

## 2021-08-03 NOTE — ASSESSMENT
[Palliative Care Plan] : not applicable at this time [FreeTextEntry1] : 66 year old male with a history of iron deficiency anemia in the setting of gastritis, gastric ulcers, duodenitis, multiple colonic polyps, and jejunal arteriovenous malformations who was also found to have an IgG lambda monoclonal gammopathy which appears to have spontaneously resolved and mild leukopenia with neutropenia. He has a history of heavy alcohol intake. Alcohol is a bone marrow toxin and, in addition to possibly causing some of his upper gastrointestinal lesions, may have suppressed his blood counts. He also has a history of exposure to organic solvents which increases his risk for the development of myelodysplastic syndrome. His relative lymphocytosis also raises the possibility of a lymphoproliferative disorder. Flow cytometry was normal. Repeat testing has found his monoclonal gammopathy to be IgM kappa. He has tapered off alcohol use with no alcohol intake in the past 10 days. \par \par Plan:\par Continue to abstain from alcohol \par Observe\par B2 microglobulins, CRP, cryoglobulins next visit\par RTC 3 months\par \par

## 2021-08-03 NOTE — ADDENDUM
[FreeTextEntry1] : I, Rustam Lorraine, acted solely as a scribe for Dr. Ziggy Alcantara on 08/03/2021. All medical entries made by the Scribe were at my, Dr. Ziggy Alcantara's, direction and personally dictated by me on 08/03/2021. I have reviewed the chart and agree that the record accurately reflects my personal performance of the history, physical exam, assessment and plan. I have also personally directed, reviewed, and agreed with the chart.

## 2021-08-03 NOTE — HISTORY OF PRESENT ILLNESS
[Disease:__________________________] : Disease: [unfilled] [de-identified] : MGUS IgG lambda; repeat here IgMk\par Leukopenia [de-identified] : He feels well. Notes occasional mild headaches at night. May be due to caffeine withdrawal. Takes aspirin for relief if needed. No other complaints. He notes no fever, night sweats, swollen glands, weight loss, visual problems, chest pain, shortness of breath, abdominal pain, bleeding, bruising, melena, rectal bleeding, hematuria, rash, skeletal pain, jaundice, dark urine. He has tapered off alcohol use with no alcohol intake in the past 10 days. \par \par

## 2021-08-03 NOTE — REASON FOR VISIT
[Follow-Up Visit] : a follow-up visit for [Monoclonal Gammopathy] : monoclonal gammopathy [Blood Count Assessment] : blood count assessment [FreeTextEntry2] : Anemia

## 2021-08-03 NOTE — CONSULT LETTER
[Dear  ___] : Dear  [unfilled], [Courtesy Letter:] : I had the pleasure of seeing your patient, [unfilled], in my office today. [Please see my note below.] : Please see my note below. [Sincerely,] : Sincerely, [DrFady  ___] : Dr. GORDON [FreeTextEntry2] : William Guerrero MD [FreeTextEntry3] : Ziggy Alcantara M.D., FACP\par Professor of Medicine\par [employer] [facility] [employer]\par Associate Chief, Division of Hematology\par [facility]\par [facility]\par [address] [place] [address]\[place] [address]\par [phone]

## 2021-08-03 NOTE — RESULTS/DATA
[FreeTextEntry1] : 8/3/21\par WBC 3730 Hgb 11.7 Hct 35.5 Platelets 165,000 Diff 30P 47L 18M 3Eos 1Ba ANC 1120\par \par 7/2/21\par CMP Glu 111\par \par SPEP: gamma-migrating paraprotein identified. \par SPIEP: IgM kappa band identified. \par IgG 1186, A 329, M 104\par SFLC kappa 1.83, lambda 1.35, ratio 1.36\par Iron/TIBC/% sat 90/281/32\par Ferritin 113\par Flow cytometry: the lymphocyte immunophenotypic findings show no diagnostic abnormalities. The myeloid immunophenotypic findings show normal myeloid granularity, no increase in myeloid immaturity, and normal myeloid antigen maturation pattern. 25% lymphocytes. \par

## 2021-09-30 ENCOUNTER — OUTPATIENT (OUTPATIENT)
Dept: OUTPATIENT SERVICES | Facility: HOSPITAL | Age: 66
LOS: 1 days | Discharge: HOME | End: 2021-09-30

## 2021-09-30 DIAGNOSIS — Z98.890 OTHER SPECIFIED POSTPROCEDURAL STATES: Chronic | ICD-10-CM

## 2021-09-30 DIAGNOSIS — D64.9 ANEMIA, UNSPECIFIED: ICD-10-CM

## 2021-09-30 DIAGNOSIS — L91.8 OTHER HYPERTROPHIC DISORDERS OF THE SKIN: Chronic | ICD-10-CM

## 2021-10-01 ENCOUNTER — RESULT REVIEW (OUTPATIENT)
Age: 66
End: 2021-10-01

## 2021-10-01 ENCOUNTER — APPOINTMENT (OUTPATIENT)
Dept: HEMATOLOGY ONCOLOGY | Facility: CLINIC | Age: 66
End: 2021-10-01
Payer: MEDICARE

## 2021-10-01 VITALS
OXYGEN SATURATION: 97 % | RESPIRATION RATE: 16 BRPM | SYSTOLIC BLOOD PRESSURE: 160 MMHG | HEART RATE: 75 BPM | HEIGHT: 66.14 IN | BODY MASS INDEX: 30.24 KG/M2 | DIASTOLIC BLOOD PRESSURE: 75 MMHG | WEIGHT: 188.15 LBS | TEMPERATURE: 97.8 F

## 2021-10-01 LAB
B2 MICROGLOB SERPL-MCNC: 1.8 MG/L
BASOPHILS # BLD AUTO: 0 K/UL — SIGNIFICANT CHANGE UP (ref 0–0.2)
BASOPHILS NFR BLD AUTO: 0 % — SIGNIFICANT CHANGE UP (ref 0–2)
EOSINOPHIL # BLD AUTO: 0 K/UL — SIGNIFICANT CHANGE UP (ref 0–0.5)
EOSINOPHIL NFR BLD AUTO: 0 % — SIGNIFICANT CHANGE UP (ref 0–6)
HCT VFR BLD CALC: 36.8 % — LOW (ref 39–50)
HGB BLD-MCNC: 11.7 G/DL — LOW (ref 13–17)
LYMPHOCYTES # BLD AUTO: 1.48 K/UL — SIGNIFICANT CHANGE UP (ref 1–3.3)
LYMPHOCYTES # BLD AUTO: 36 % — SIGNIFICANT CHANGE UP (ref 13–44)
MCHC RBC-ENTMCNC: 31.3 PG — SIGNIFICANT CHANGE UP (ref 27–34)
MCHC RBC-ENTMCNC: 31.8 G/DL — LOW (ref 32–36)
MCV RBC AUTO: 98.4 FL — SIGNIFICANT CHANGE UP (ref 80–100)
MONOCYTES # BLD AUTO: 1.27 K/UL — HIGH (ref 0–0.9)
MONOCYTES NFR BLD AUTO: 31 % — HIGH (ref 2–14)
MYELOCYTES NFR BLD: 1 % — HIGH (ref 0–0)
NEUTROPHILS # BLD AUTO: 1.32 K/UL — LOW (ref 1.8–7.4)
NEUTROPHILS NFR BLD AUTO: 32 % — LOW (ref 43–77)
NRBC # BLD: 0 /100 — SIGNIFICANT CHANGE UP (ref 0–0)
NRBC # BLD: SIGNIFICANT CHANGE UP /100 WBCS (ref 0–0)
PLAT MORPH BLD: NORMAL — SIGNIFICANT CHANGE UP
PLATELET # BLD AUTO: 170 K/UL — SIGNIFICANT CHANGE UP (ref 150–400)
RBC # BLD: 3.74 M/UL — LOW (ref 4.2–5.8)
RBC # FLD: 15.7 % — HIGH (ref 10.3–14.5)
RBC BLD AUTO: SIGNIFICANT CHANGE UP
WBC # BLD: 4.11 K/UL — SIGNIFICANT CHANGE UP (ref 3.8–10.5)
WBC # FLD AUTO: 4.11 K/UL — SIGNIFICANT CHANGE UP (ref 3.8–10.5)

## 2021-10-01 PROCEDURE — 99213 OFFICE O/P EST LOW 20 MIN: CPT

## 2021-10-04 LAB
CRP SERPL-MCNC: 3 MG/L
CRYOGLOB SERPL-MCNC: NEGATIVE

## 2021-10-05 NOTE — REASON FOR VISIT
[Follow-Up Visit] : a follow-up visit for [Blood Count Assessment] : blood count assessment [Monoclonal Gammopathy] : monoclonal gammopathy [FreeTextEntry2] : Anemia

## 2021-10-05 NOTE — ASSESSMENT
[Palliative Care Plan] : not applicable at this time [FreeTextEntry1] : 66 year old male with a history of iron deficiency anemia in the setting of gastritis, gastric ulcers, duodenitis, multiple colonic polyps, and jejunal arteriovenous malformations who was also found to have an IgG lambda monoclonal gammopathy which appears to have spontaneously resolved and mild leukopenia with neutropenia. He has a history of heavy alcohol intake. Alcohol is a bone marrow toxin and, in addition to possibly causing some of his upper gastrointestinal lesions, may have suppressed his blood counts. He also has a history of exposure to organic solvents which increases his risk for the development of myelodysplastic syndrome. His relative lymphocytosis also raises the possibility of a lymphoproliferative disorder. Flow cytometry was normal. Repeat testing has found his monoclonal gammopathy to be IgM kappa. He has tapered off alcohol use with no alcohol intake in the past 10 days. Hgb 11.7, ANC 1320.   \par \par Plan:\par Continue to abstain from alcohol \par Observe\par B2 microglobulins, CRP, cryoglobulins done.  \par RTC 3 months\par \par

## 2021-10-05 NOTE — HISTORY OF PRESENT ILLNESS
[Disease:__________________________] : Disease: [unfilled] [de-identified] : MGUS IgG lambda; repeat here IgMk\par Leukopenia [de-identified] : He feels well. Notes occasional mild headaches at night. May be due to caffeine withdrawal. Takes aspirin for relief if needed. No other complaints. He notes no fever, night sweats, swollen glands, weight loss, visual problems, chest pain, shortness of breath, abdominal pain, bleeding, bruising, melena, rectal bleeding, hematuria, rash, skeletal pain, jaundice, dark urine. He has tapered off alcohol use with no alcohol intake in the last 2 months.  \par \par

## 2021-10-05 NOTE — CONSULT LETTER
[Dear  ___] : Dear  [unfilled], [Courtesy Letter:] : I had the pleasure of seeing your patient, [unfilled], in my office today. [Please see my note below.] : Please see my note below. [Sincerely,] : Sincerely, [DrFady  ___] : Dr. GORDON [FreeTextEntry2] : William Guerrero MD [FreeTextEntry3] : Ziggy Alcantara M.D., FACP\par Professor of Medicine\par [employer]\par Associate Chief, Division of Hematology\par [facility]\par [facility]\par [address] [place] [address]\[place] [address]\par [phone]

## 2021-11-21 ENCOUNTER — TRANSCRIPTION ENCOUNTER (OUTPATIENT)
Age: 66
End: 2021-11-21

## 2021-12-02 ENCOUNTER — OUTPATIENT (OUTPATIENT)
Dept: OUTPATIENT SERVICES | Facility: HOSPITAL | Age: 66
LOS: 1 days | Discharge: HOME | End: 2021-12-02

## 2021-12-02 DIAGNOSIS — Z98.890 OTHER SPECIFIED POSTPROCEDURAL STATES: Chronic | ICD-10-CM

## 2021-12-02 DIAGNOSIS — D64.9 ANEMIA, UNSPECIFIED: ICD-10-CM

## 2021-12-02 DIAGNOSIS — L91.8 OTHER HYPERTROPHIC DISORDERS OF THE SKIN: Chronic | ICD-10-CM

## 2021-12-03 ENCOUNTER — APPOINTMENT (OUTPATIENT)
Dept: HEMATOLOGY ONCOLOGY | Facility: CLINIC | Age: 66
End: 2021-12-03
Payer: MEDICARE

## 2021-12-03 ENCOUNTER — RESULT REVIEW (OUTPATIENT)
Age: 66
End: 2021-12-03

## 2021-12-03 VITALS
WEIGHT: 186.73 LBS | OXYGEN SATURATION: 97 % | TEMPERATURE: 97.2 F | SYSTOLIC BLOOD PRESSURE: 152 MMHG | BODY MASS INDEX: 30.37 KG/M2 | HEART RATE: 67 BPM | HEIGHT: 65.83 IN | DIASTOLIC BLOOD PRESSURE: 80 MMHG | RESPIRATION RATE: 18 BRPM

## 2021-12-03 LAB
BASOPHILS # BLD AUTO: 0 K/UL — SIGNIFICANT CHANGE UP (ref 0–0.2)
BASOPHILS NFR BLD AUTO: 0 % — SIGNIFICANT CHANGE UP (ref 0–2)
EOSINOPHIL # BLD AUTO: 0.07 K/UL — SIGNIFICANT CHANGE UP (ref 0–0.5)
EOSINOPHIL NFR BLD AUTO: 2 % — SIGNIFICANT CHANGE UP (ref 0–6)
HCT VFR BLD CALC: 35.3 % — LOW (ref 39–50)
HGB BLD-MCNC: 11.7 G/DL — LOW (ref 13–17)
LYMPHOCYTES # BLD AUTO: 1.44 K/UL — SIGNIFICANT CHANGE UP (ref 1–3.3)
LYMPHOCYTES # BLD AUTO: 39 % — SIGNIFICANT CHANGE UP (ref 13–44)
MCHC RBC-ENTMCNC: 31.3 PG — SIGNIFICANT CHANGE UP (ref 27–34)
MCHC RBC-ENTMCNC: 33.1 G/DL — SIGNIFICANT CHANGE UP (ref 32–36)
MCV RBC AUTO: 94.4 FL — SIGNIFICANT CHANGE UP (ref 80–100)
MONOCYTES # BLD AUTO: 1.03 K/UL — HIGH (ref 0–0.9)
MONOCYTES NFR BLD AUTO: 28 % — HIGH (ref 2–14)
NEUTROPHILS # BLD AUTO: 1.14 K/UL — LOW (ref 1.8–7.4)
NEUTROPHILS NFR BLD AUTO: 31 % — LOW (ref 43–77)
NRBC # BLD: 0 /100 — SIGNIFICANT CHANGE UP (ref 0–0)
NRBC # BLD: SIGNIFICANT CHANGE UP /100 WBCS (ref 0–0)
PLAT MORPH BLD: NORMAL — SIGNIFICANT CHANGE UP
PLATELET # BLD AUTO: 166 K/UL — SIGNIFICANT CHANGE UP (ref 150–400)
RBC # BLD: 3.74 M/UL — LOW (ref 4.2–5.8)
RBC # FLD: 16.2 % — HIGH (ref 10.3–14.5)
RBC BLD AUTO: SIGNIFICANT CHANGE UP
SMUDGE CELLS # BLD: PRESENT — SIGNIFICANT CHANGE UP
WBC # BLD: 3.68 K/UL — LOW (ref 3.8–10.5)
WBC # FLD AUTO: 3.68 K/UL — LOW (ref 3.8–10.5)

## 2021-12-03 PROCEDURE — 99214 OFFICE O/P EST MOD 30 MIN: CPT

## 2021-12-03 NOTE — REVIEW OF SYSTEMS
[Negative] : Allergic/Immunologic [Fatigue] : fatigue [Joint Pain] : joint pain [FreeTextEntry9] : right groin pain [de-identified] : HA

## 2021-12-03 NOTE — CONSULT LETTER
[Dear  ___] : Dear  [unfilled], [Courtesy Letter:] : I had the pleasure of seeing your patient, [unfilled], in my office today. [Please see my note below.] : Please see my note below. [Sincerely,] : Sincerely, [DrFady  ___] : Dr. GORDON [FreeTextEntry2] : William Guerrero MD [FreeTextEntry3] : Ziggy Alcantara M.D., FACP\par Professor of Medicine\par [facility] [employer]\par Associate Chief, Division of Hematology\par [facility]\par [facility]\par [address] [place] [address]\[place] [address]\par [phone]

## 2021-12-03 NOTE — HISTORY OF PRESENT ILLNESS
[Disease:__________________________] : Disease: [unfilled] [de-identified] : MGUS IgG lambda; repeat here IgMk\par Leukopenia [de-identified] : He feels well. Fatigued. Reports interrupted sleep due to nocturia multiple times a night. Still notes occasional mild headaches at night. May be due to caffeine withdrawal. Has chronic general arthralgias. Has constant mild right groin pain. He has a history of a stress fracture in that location. No other complaints. He notes no fever, night sweats, swollen glands, weight loss, visual problems, chest pain, shortness of breath, abdominal pain, bleeding, bruising, melena, rectal bleeding, hematuria, rash, skeletal pain, jaundice, dark urine. He has tapered his alcohol use and now drinks approximately one every other day. Had COVID booster and flu vaccine. \par \par

## 2021-12-03 NOTE — ADDENDUM
[FreeTextEntry1] : I, Rustam Lorraine, acted solely as a scribe for Dr. Ziggy Alcantara on 12/03/2021. All medical entries made by the Scribe were at my, Dr. Ziggy Alcantara's, direction and personally dictated by me on 12/03/2021. I have reviewed the chart and agree that the record accurately reflects my personal performance of the history, physical exam, assessment and plan. I have also personally directed, reviewed, and agreed with the chart.

## 2021-12-03 NOTE — RESULTS/DATA
[FreeTextEntry1] : WBC 3680 Hgb 11.7 Hct 35.3 Platelets 166,000 Diff pending \par \par 10/1/21\par Beta-2 microglobulin 1.8\par CRP 3\par Cryoglobulin negative \par \par \par

## 2021-12-03 NOTE — PHYSICAL EXAM
[Fully active, able to carry on all pre-disease performance without restriction] : Status 0 - Fully active, able to carry on all pre-disease performance without restriction [Normal] : affect appropriate [de-identified] : Bilateral arcus senilis [de-identified] : Normal testes. No hernias. [de-identified] : RLE SLR negative, FROM, hip nontender.

## 2021-12-03 NOTE — ASSESSMENT
[Palliative Care Plan] : not applicable at this time [FreeTextEntry1] : 66 year old male with a history of iron deficiency anemia in the setting of gastritis, gastric ulcers, duodenitis, multiple colonic polyps, and jejunal arteriovenous malformations who was also found to have an IgG lambda monoclonal gammopathy which appears to have spontaneously resolved and mild leukopenia with neutropenia. He has a history of heavy alcohol intake. Alcohol is a bone marrow toxin and, in addition to possibly causing some of his upper gastrointestinal lesions, may have suppressed his blood counts. He also has a history of exposure to organic solvents which increases his risk for the development of myelodysplastic syndrome. His relative lymphocytosis also raises the possibility of a lymphoproliferative disorder. Flow cytometry was normal. Repeat testing has found his monoclonal gammopathy to be IgM kappa. He has tapered his alcohol use and is currently drinking every other day. Despite cutting his alcohol intake, his counts are still borderline low. In view of this, discussed doing a bone marrow evaluation with the patient. He agrees to proceed. \par \par Plan:\par Observe\par Urged to abstain from alcohol \par Orthopedic surgery f/u \par Arrange bone marrow biopsy with iron, flow, cytogenetics, FISH lymphoma/MDS, NGS\par MGUS panel next visit \par RTC 3 months\par \par \par \par

## 2021-12-15 ENCOUNTER — LABORATORY RESULT (OUTPATIENT)
Age: 66
End: 2021-12-15

## 2021-12-15 ENCOUNTER — RESULT REVIEW (OUTPATIENT)
Age: 66
End: 2021-12-15

## 2021-12-15 ENCOUNTER — APPOINTMENT (OUTPATIENT)
Dept: HEMATOLOGY ONCOLOGY | Facility: CLINIC | Age: 66
End: 2021-12-15
Payer: MEDICARE

## 2021-12-15 VITALS
RESPIRATION RATE: 17 BRPM | BODY MASS INDEX: 29.76 KG/M2 | HEIGHT: 66 IN | SYSTOLIC BLOOD PRESSURE: 143 MMHG | WEIGHT: 185.19 LBS | OXYGEN SATURATION: 97 % | TEMPERATURE: 97.4 F | DIASTOLIC BLOOD PRESSURE: 75 MMHG | HEART RATE: 63 BPM

## 2021-12-15 LAB
ALBUMIN SERPL ELPH-MCNC: 4.6 G/DL
ALP BLD-CCNC: 81 U/L
ALT SERPL-CCNC: 17 U/L
ANION GAP SERPL CALC-SCNC: 13 MMOL/L
AST SERPL-CCNC: 26 U/L
BASOPHILS # BLD AUTO: 0.02 K/UL — SIGNIFICANT CHANGE UP (ref 0–0.2)
BASOPHILS NFR BLD AUTO: 0.5 % — SIGNIFICANT CHANGE UP (ref 0–2)
BILIRUB SERPL-MCNC: 0.2 MG/DL
BUN SERPL-MCNC: 17 MG/DL
CALCIUM SERPL-MCNC: 9.6 MG/DL
CHLORIDE SERPL-SCNC: 106 MMOL/L
CO2 SERPL-SCNC: 24 MMOL/L
CREAT SERPL-MCNC: 1.1 MG/DL
EOSINOPHIL # BLD AUTO: 0.04 K/UL — SIGNIFICANT CHANGE UP (ref 0–0.5)
EOSINOPHIL NFR BLD AUTO: 1.1 % — SIGNIFICANT CHANGE UP (ref 0–6)
GLUCOSE SERPL-MCNC: 109 MG/DL
HCT VFR BLD CALC: 36.9 % — LOW (ref 39–50)
HGB BLD-MCNC: 11.7 G/DL — LOW (ref 13–17)
IMM GRANULOCYTES NFR BLD AUTO: 1.4 % — SIGNIFICANT CHANGE UP (ref 0–1.5)
LDH SERPL-CCNC: 218 U/L
LYMPHOCYTES # BLD AUTO: 1.62 K/UL — SIGNIFICANT CHANGE UP (ref 1–3.3)
LYMPHOCYTES # BLD AUTO: 44.4 % — HIGH (ref 13–44)
MCHC RBC-ENTMCNC: 31 PG — SIGNIFICANT CHANGE UP (ref 27–34)
MCHC RBC-ENTMCNC: 31.7 G/DL — LOW (ref 32–36)
MCV RBC AUTO: 97.9 FL — SIGNIFICANT CHANGE UP (ref 80–100)
MONOCYTES # BLD AUTO: 1.23 K/UL — HIGH (ref 0–0.9)
MONOCYTES NFR BLD AUTO: 33.7 % — HIGH (ref 2–14)
NEUTROPHILS # BLD AUTO: 0.69 K/UL — LOW (ref 1.8–7.4)
NEUTROPHILS NFR BLD AUTO: 18.9 % — LOW (ref 43–77)
NRBC # BLD: 0 /100 WBCS — SIGNIFICANT CHANGE UP (ref 0–0)
PLATELET # BLD AUTO: 170 K/UL — SIGNIFICANT CHANGE UP (ref 150–400)
POTASSIUM SERPL-SCNC: 4.3 MMOL/L
PROT SERPL-MCNC: 7.6 G/DL
RBC # BLD: 3.77 M/UL — LOW (ref 4.2–5.8)
RBC # FLD: 16.4 % — HIGH (ref 10.3–14.5)
SODIUM SERPL-SCNC: 143 MMOL/L
WBC # BLD: 3.65 K/UL — LOW (ref 3.8–10.5)
WBC # FLD AUTO: 3.65 K/UL — LOW (ref 3.8–10.5)

## 2021-12-15 PROCEDURE — 38222 DX BONE MARROW BX & ASPIR: CPT

## 2021-12-16 LAB
DEPRECATED KAPPA LC FREE/LAMBDA SER: 1.46 RATIO
DEPRECATED KAPPA LC FREE/LAMBDA SER: 1.46 RATIO
IGA SER QL IEP: 361 MG/DL
IGG SER QL IEP: 1322 MG/DL
IGM SER QL IEP: 95 MG/DL
KAPPA LC CSF-MCNC: 1.62 MG/DL
KAPPA LC CSF-MCNC: 1.62 MG/DL
KAPPA LC SERPL-MCNC: 2.37 MG/DL
KAPPA LC SERPL-MCNC: 2.37 MG/DL

## 2022-01-03 ENCOUNTER — OUTPATIENT (OUTPATIENT)
Dept: OUTPATIENT SERVICES | Facility: HOSPITAL | Age: 67
LOS: 1 days | Discharge: HOME | End: 2022-01-03

## 2022-01-03 DIAGNOSIS — L91.8 OTHER HYPERTROPHIC DISORDERS OF THE SKIN: Chronic | ICD-10-CM

## 2022-01-03 DIAGNOSIS — Z98.890 OTHER SPECIFIED POSTPROCEDURAL STATES: Chronic | ICD-10-CM

## 2022-01-03 DIAGNOSIS — D64.9 ANEMIA, UNSPECIFIED: ICD-10-CM

## 2022-01-04 ENCOUNTER — APPOINTMENT (OUTPATIENT)
Dept: HEMATOLOGY ONCOLOGY | Facility: CLINIC | Age: 67
End: 2022-01-04

## 2022-01-18 ENCOUNTER — RESULT REVIEW (OUTPATIENT)
Age: 67
End: 2022-01-18

## 2022-01-18 ENCOUNTER — APPOINTMENT (OUTPATIENT)
Dept: HEMATOLOGY ONCOLOGY | Facility: CLINIC | Age: 67
End: 2022-01-18

## 2022-01-18 LAB
BASOPHILS # BLD AUTO: 0 K/UL — SIGNIFICANT CHANGE UP (ref 0–0.2)
BASOPHILS NFR BLD AUTO: 0 % — SIGNIFICANT CHANGE UP (ref 0–2)
EOSINOPHIL # BLD AUTO: 0.03 K/UL — SIGNIFICANT CHANGE UP (ref 0–0.5)
EOSINOPHIL NFR BLD AUTO: 1 % — SIGNIFICANT CHANGE UP (ref 0–6)
HCT VFR BLD CALC: 36.5 % — LOW (ref 39–50)
HGB BLD-MCNC: 11.5 G/DL — LOW (ref 13–17)
LYMPHOCYTES # BLD AUTO: 1.16 K/UL — SIGNIFICANT CHANGE UP (ref 1–3.3)
LYMPHOCYTES # BLD AUTO: 39 % — SIGNIFICANT CHANGE UP (ref 13–44)
MCHC RBC-ENTMCNC: 30.3 PG — SIGNIFICANT CHANGE UP (ref 27–34)
MCHC RBC-ENTMCNC: 31.5 G/DL — LOW (ref 32–36)
MCV RBC AUTO: 96.1 FL — SIGNIFICANT CHANGE UP (ref 80–100)
METAMYELOCYTES # FLD: 1 % — HIGH (ref 0–0)
MONOCYTES # BLD AUTO: 0.62 K/UL — SIGNIFICANT CHANGE UP (ref 0–0.9)
MONOCYTES NFR BLD AUTO: 21 % — HIGH (ref 2–14)
NEUTROPHILS # BLD AUTO: 1.13 K/UL — LOW (ref 1.8–7.4)
NEUTROPHILS NFR BLD AUTO: 38 % — LOW (ref 43–77)
NRBC # BLD: 0 /100 — SIGNIFICANT CHANGE UP (ref 0–0)
NRBC # BLD: SIGNIFICANT CHANGE UP /100 WBCS (ref 0–0)
PLAT MORPH BLD: NORMAL — SIGNIFICANT CHANGE UP
PLATELET # BLD AUTO: 175 K/UL — SIGNIFICANT CHANGE UP (ref 150–400)
RBC # BLD: 3.8 M/UL — LOW (ref 4.2–5.8)
RBC # FLD: 16.2 % — HIGH (ref 10.3–14.5)
RBC BLD AUTO: SIGNIFICANT CHANGE UP
WBC # BLD: 2.97 K/UL — LOW (ref 3.8–10.5)
WBC # FLD AUTO: 2.97 K/UL — LOW (ref 3.8–10.5)

## 2022-01-19 LAB
ALBUMIN SERPL ELPH-MCNC: 4.5 G/DL
ALP BLD-CCNC: 82 U/L
ALT SERPL-CCNC: 17 U/L
ANION GAP SERPL CALC-SCNC: 12 MMOL/L
AST SERPL-CCNC: 25 U/L
B2 MICROGLOB SERPL-MCNC: 1.8 MG/L
BILIRUB SERPL-MCNC: 0.2 MG/DL
BUN SERPL-MCNC: 14 MG/DL
CALCIUM SERPL-MCNC: 9.9 MG/DL
CHLORIDE SERPL-SCNC: 103 MMOL/L
CO2 SERPL-SCNC: 25 MMOL/L
CREAT SERPL-MCNC: 1.02 MG/DL
GLUCOSE SERPL-MCNC: 137 MG/DL
LDH SERPL-CCNC: 223 U/L
POTASSIUM SERPL-SCNC: 4.6 MMOL/L
PROT SERPL-MCNC: 7.5 G/DL
SODIUM SERPL-SCNC: 139 MMOL/L

## 2022-01-27 LAB
ALBUMIN MFR SERPL ELPH: 57 %
ALBUMIN SERPL-MCNC: 4.3 G/DL
ALBUMIN/GLOB SERPL: 1.3 RATIO
ALPHA1 GLOB MFR SERPL ELPH: 3.6 %
ALPHA1 GLOB SERPL ELPH-MCNC: 0.3 G/DL
ALPHA2 GLOB MFR SERPL ELPH: 9.6 %
ALPHA2 GLOB SERPL ELPH-MCNC: 0.7 G/DL
B-GLOBULIN MFR SERPL ELPH: 12.4 %
B-GLOBULIN SERPL ELPH-MCNC: 0.9 G/DL
DEPRECATED KAPPA LC FREE/LAMBDA SER: 1.48 RATIO
GAMMA GLOB FLD ELPH-MCNC: 1.3 G/DL
GAMMA GLOB MFR SERPL ELPH: 17.4 %
IGA SER QL IEP: 385 MG/DL
IGG SER QL IEP: 1346 MG/DL
IGM SER QL IEP: 113 MG/DL
INTERPRETATION SERPL IEP-IMP: NORMAL
KAPPA LC CSF-MCNC: 1.67 MG/DL
KAPPA LC SERPL-MCNC: 2.47 MG/DL
M PROTEIN MFR SERPL ELPH: NORMAL
M PROTEIN SPEC IFE-MCNC: NORMAL
MONOCLON BAND OBS SERPL: NORMAL
PROT SERPL-MCNC: 7.5 G/DL
PROT SERPL-MCNC: 7.5 G/DL

## 2022-02-11 NOTE — ED ADULT NURSE NOTE - NSSISCREENINGQ4_ED_A_ED
Render Risk Assessment In Note?: yes
Detail Level: Simple
Additional Notes: Samples used - NO CHARGE
No

## 2022-03-07 ENCOUNTER — OUTPATIENT (OUTPATIENT)
Dept: OUTPATIENT SERVICES | Facility: HOSPITAL | Age: 67
LOS: 1 days | Discharge: HOME | End: 2022-03-07

## 2022-03-07 DIAGNOSIS — D64.9 ANEMIA, UNSPECIFIED: ICD-10-CM

## 2022-03-07 DIAGNOSIS — Z98.890 OTHER SPECIFIED POSTPROCEDURAL STATES: Chronic | ICD-10-CM

## 2022-03-07 DIAGNOSIS — L91.8 OTHER HYPERTROPHIC DISORDERS OF THE SKIN: Chronic | ICD-10-CM

## 2022-03-08 ENCOUNTER — APPOINTMENT (OUTPATIENT)
Dept: HEMATOLOGY ONCOLOGY | Facility: CLINIC | Age: 67
End: 2022-03-08
Payer: MEDICARE

## 2022-03-08 ENCOUNTER — RESULT REVIEW (OUTPATIENT)
Age: 67
End: 2022-03-08

## 2022-03-08 ENCOUNTER — LABORATORY RESULT (OUTPATIENT)
Age: 67
End: 2022-03-08

## 2022-03-08 VITALS
RESPIRATION RATE: 16 BRPM | WEIGHT: 189.13 LBS | BODY MASS INDEX: 30.76 KG/M2 | TEMPERATURE: 98.4 F | HEIGHT: 65.83 IN | HEART RATE: 70 BPM | SYSTOLIC BLOOD PRESSURE: 161 MMHG | OXYGEN SATURATION: 96 % | DIASTOLIC BLOOD PRESSURE: 72 MMHG

## 2022-03-08 DIAGNOSIS — D72.820 LYMPHOCYTOSIS (SYMPTOMATIC): ICD-10-CM

## 2022-03-08 DIAGNOSIS — U07.1 COVID-19: ICD-10-CM

## 2022-03-08 DIAGNOSIS — D50.9 IRON DEFICIENCY ANEMIA, UNSPECIFIED: ICD-10-CM

## 2022-03-08 DIAGNOSIS — D72.819 DECREASED WHITE BLOOD CELL COUNT, UNSPECIFIED: ICD-10-CM

## 2022-03-08 LAB
BASOPHILS # BLD AUTO: 0.01 K/UL — SIGNIFICANT CHANGE UP (ref 0–0.2)
BASOPHILS NFR BLD AUTO: 0.3 % — SIGNIFICANT CHANGE UP (ref 0–2)
EOSINOPHIL # BLD AUTO: 0.03 K/UL — SIGNIFICANT CHANGE UP (ref 0–0.5)
EOSINOPHIL NFR BLD AUTO: 1 % — SIGNIFICANT CHANGE UP (ref 0–6)
HCT VFR BLD CALC: 40.5 % — SIGNIFICANT CHANGE UP (ref 39–50)
HGB BLD-MCNC: 13.1 G/DL — SIGNIFICANT CHANGE UP (ref 13–17)
IMM GRANULOCYTES NFR BLD AUTO: 3.7 % — HIGH (ref 0–1.5)
LYMPHOCYTES # BLD AUTO: 1.14 K/UL — SIGNIFICANT CHANGE UP (ref 1–3.3)
LYMPHOCYTES # BLD AUTO: 38.3 % — SIGNIFICANT CHANGE UP (ref 13–44)
MCHC RBC-ENTMCNC: 30.8 PG — SIGNIFICANT CHANGE UP (ref 27–34)
MCHC RBC-ENTMCNC: 32.3 G/DL — SIGNIFICANT CHANGE UP (ref 32–36)
MCV RBC AUTO: 95.1 FL — SIGNIFICANT CHANGE UP (ref 80–100)
MONOCYTES # BLD AUTO: 0.56 K/UL — SIGNIFICANT CHANGE UP (ref 0–0.9)
MONOCYTES NFR BLD AUTO: 18.8 % — HIGH (ref 2–14)
NEUTROPHILS # BLD AUTO: 1.13 K/UL — LOW (ref 1.8–7.4)
NEUTROPHILS NFR BLD AUTO: 37.9 % — LOW (ref 43–77)
NRBC # BLD: 0 /100 WBCS — SIGNIFICANT CHANGE UP (ref 0–0)
PLATELET # BLD AUTO: 190 K/UL — SIGNIFICANT CHANGE UP (ref 150–400)
RBC # BLD: 4.26 M/UL — SIGNIFICANT CHANGE UP (ref 4.2–5.8)
RBC # FLD: 16.3 % — HIGH (ref 10.3–14.5)
WBC # BLD: 2.98 K/UL — LOW (ref 3.8–10.5)
WBC # FLD AUTO: 2.98 K/UL — LOW (ref 3.8–10.5)

## 2022-03-08 PROCEDURE — 99214 OFFICE O/P EST MOD 30 MIN: CPT

## 2022-03-08 RX ORDER — MIRTAZAPINE 7.5 MG/1
7.5 TABLET, FILM COATED ORAL
Refills: 0 | Status: ACTIVE | COMMUNITY
Start: 2019-06-20

## 2022-03-08 NOTE — ADDENDUM
[FreeTextEntry1] : I, Rustam Peters, acted solely as a scribe for Dr. Ziggy Alcantara on 03/08/2022. All medical entries made by the Scribe were at my, Dr. Ziggy Alcantara's, direction and personally dictated by me on 03/08/2022. I have reviewed the chart and agree that the record accurately reflects my personal performance of the history, physical exam, assessment and plan. I have also personally directed, reviewed, and agreed with the chart.

## 2022-03-08 NOTE — REVIEW OF SYSTEMS
[Fatigue] : fatigue [Joint Pain] : joint pain [Negative] : Allergic/Immunologic [FreeTextEntry8] : nocturia [de-identified] : HA

## 2022-03-08 NOTE — CONSULT LETTER
[Dear  ___] : Dear  [unfilled], [Courtesy Letter:] : I had the pleasure of seeing your patient, [unfilled], in my office today. [Please see my note below.] : Please see my note below. [Sincerely,] : Sincerely, [DrFady  ___] : Dr. GORDON [FreeTextEntry2] : William Guerrero MD [FreeTextEntry3] : Ziggy Alcantara M.D., FACP\par Professor of Medicine\par [employer]\par Associate Chief, Division of Hematology\par [facility]\par [facility]\par [address] [place] [address]\[place] [address]\par [phone]

## 2022-03-08 NOTE — PHYSICAL EXAM
[Fully active, able to carry on all pre-disease performance without restriction] : Status 0 - Fully active, able to carry on all pre-disease performance without restriction [Normal] : affect appropriate [de-identified] : Bilateral arcus senilis

## 2022-03-08 NOTE — ASSESSMENT
[Palliative Care Plan] : not applicable at this time [FreeTextEntry1] : 66 year old male with a history of iron deficiency anemia in the setting of gastritis, gastric ulcers, duodenitis, multiple colonic polyps, and jejunal arteriovenous malformations who was also found to have an IgG lambda monoclonal gammopathy which appears to have spontaneously resolved and mild leukopenia with neutropenia. He has a history of heavy alcohol intake. Alcohol is a bone marrow toxin and, in addition to possibly causing some of his upper gastrointestinal lesions, may have suppressed his blood counts. He also has a history of exposure to organic solvents which increases his risk for the development of myelodysplastic syndrome. His relative lymphocytosis also raises the possibility of a lymphoproliferative disorder. Flow cytometry was normal. Repeat testing has found his monoclonal gammopathy to be IgM kappa. He has tapered his alcohol use and is currently drinking 1-3 drinks once every 1-2 weeks. Despite cutting his alcohol intake, his counts are still borderline low. Observation alone is still indicated. Discussed recent bone marrow examination findings in detail with the patient. Marrow raised the possibility of CMMoL, but he no longer has monocytosis. Will continue to monitor. Answered all questions. \par \par Plan:\par Observe\par Urged to limit alcohol as he is doing\par To ER if febrile or ill \par CMP, LDH, SPEP, Quant Ig, SFLC\par RTC 4 months\par

## 2022-03-08 NOTE — HISTORY OF PRESENT ILLNESS
[Disease:__________________________] : Disease: [unfilled] [de-identified] : MGUS IgG lambda; repeat here IgMk\par Leukopenia\par 12/21 Marrow:minimalmonocytosis, 10% RS. 46, XY; FISH negative;  NGS + SRSF2, IDH2 -- ??CMMoL-1\par  [de-identified] : He feels well. Still fatigued. Still reports interrupted sleep due to nocturia multiple times a night. Still notes occasional mild headaches at night. May be due to caffeine withdrawal as drinking one cup of coffee before bed prevents the headache. Still has occasional mild right groin pain that radiates to his hip and lower back. He has a history of a stress fracture in that location. Has not seen Ortho. No other complaints. He notes no fever, night sweats, swollen glands, weight loss, visual problems, chest pain, shortness of breath, abdominal pain, bleeding, bruising, melena, rectal bleeding, hematuria, rash, skeletal pain, jaundice, dark urine. He has tapered his alcohol use and now drinks 1-3 drinks once every 1-2 weeks.\par

## 2022-03-08 NOTE — RESULTS/DATA
[FreeTextEntry1] : WBC 2980 Hgb 13.1 Hct 40.5 Platelets 190,000 Diff 38P 38L 19M 4Imm Gran 1Eos ANC 1130\par \par 1/18/22\par CMP Glu 137\par \par SPIEP Co-migrating weak IgM kappa and weak IgM lambda bands identified.\par SPEP: co-migrating weak gamma-migrating paraproteins identified. \par IgG 1346, A 385, M 113\par SFLC kappa 2.47, lambda 1.67, ratio 1.48\par Beta 2 microglobulin 1.8\par \par 12/15/21\par Bone marrow biopsy and aspirate: Myeloid predominant trilineage hematopoiesis with maturation (mild left shift), minimal monocytosis, normal megakaryocyte morphology, mild\par perivascular plasmacytosis, and iron stores present (ring sideroblasts present, 10%). 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 The bone marrow shows mild myeloid predominance with mild perivascular plasmacytosis; Immunohistochemistry shows mild monocytosis with no increase in blasts; iron stores are present with 10% ring sideroblasts. (Non- neoplastic causes of ring sideroblasts should be excluded such as alcohol, nutritional, toxins drugs). Flow cytometry shows no diagnostic abnormalities with myeloid left shift, minimal monocytosis with partial loss of CD14, otherwise normal immunophenotype, and lymphopenia. Normal FISH MDS panel. Normal male karyotype 46, XY [20].

## 2022-03-10 LAB
ALBUMIN SERPL ELPH-MCNC: 5 G/DL
ALP BLD-CCNC: 91 U/L
ALT SERPL-CCNC: 24 U/L
ANION GAP SERPL CALC-SCNC: 14 MMOL/L
AST SERPL-CCNC: 40 U/L
BILIRUB SERPL-MCNC: 0.2 MG/DL
BUN SERPL-MCNC: 18 MG/DL
CALCIUM SERPL-MCNC: 10.1 MG/DL
CHLORIDE SERPL-SCNC: 100 MMOL/L
CO2 SERPL-SCNC: 24 MMOL/L
CREAT SERPL-MCNC: 1.01 MG/DL
DEPRECATED KAPPA LC FREE/LAMBDA SER: 1.43 RATIO
DEPRECATED KAPPA LC FREE/LAMBDA SER: 1.43 RATIO
EGFR: 82 ML/MIN/1.73M2
GLUCOSE SERPL-MCNC: 163 MG/DL
IGA SER QL IEP: 427 MG/DL
IGG SER QL IEP: 1565 MG/DL
IGM SER QL IEP: 104 MG/DL
KAPPA LC CSF-MCNC: 1.62 MG/DL
KAPPA LC CSF-MCNC: 1.62 MG/DL
KAPPA LC SERPL-MCNC: 2.32 MG/DL
KAPPA LC SERPL-MCNC: 2.32 MG/DL
LDH SERPL-CCNC: 424 U/L
POTASSIUM SERPL-SCNC: 5 MMOL/L
PROT SERPL-MCNC: 8.5 G/DL
SODIUM SERPL-SCNC: 138 MMOL/L

## 2022-03-16 LAB
ALBUMIN MFR SERPL ELPH: 54.4 %
ALBUMIN SERPL-MCNC: 4.6 G/DL
ALBUMIN/GLOB SERPL: 1.2 RATIO
ALPHA1 GLOB MFR SERPL ELPH: 3.5 %
ALPHA1 GLOB SERPL ELPH-MCNC: 0.3 G/DL
ALPHA2 GLOB MFR SERPL ELPH: 11.1 %
ALPHA2 GLOB SERPL ELPH-MCNC: 0.9 G/DL
B-GLOBULIN MFR SERPL ELPH: 12.5 %
B-GLOBULIN SERPL ELPH-MCNC: 1.1 G/DL
GAMMA GLOB FLD ELPH-MCNC: 1.6 G/DL
GAMMA GLOB MFR SERPL ELPH: 18.5 %
INTERPRETATION SERPL IEP-IMP: NORMAL
M PROTEIN MFR SERPL ELPH: NORMAL
MONOCLON BAND OBS SERPL: NORMAL
PROT SERPL-MCNC: 8.5 G/DL
PROT SERPL-MCNC: 8.5 G/DL

## 2022-06-24 ENCOUNTER — OUTPATIENT (OUTPATIENT)
Dept: OUTPATIENT SERVICES | Facility: HOSPITAL | Age: 67
LOS: 1 days | Discharge: HOME | End: 2022-06-24

## 2022-06-24 DIAGNOSIS — D64.9 ANEMIA, UNSPECIFIED: ICD-10-CM

## 2022-06-24 DIAGNOSIS — Z98.890 OTHER SPECIFIED POSTPROCEDURAL STATES: Chronic | ICD-10-CM

## 2022-06-24 DIAGNOSIS — L91.8 OTHER HYPERTROPHIC DISORDERS OF THE SKIN: Chronic | ICD-10-CM

## 2022-07-05 DIAGNOSIS — D47.2 MONOCLONAL GAMMOPATHY: ICD-10-CM

## 2022-07-08 ENCOUNTER — APPOINTMENT (OUTPATIENT)
Dept: HEMATOLOGY ONCOLOGY | Facility: CLINIC | Age: 67
End: 2022-07-08

## 2023-09-06 NOTE — DISCHARGE NOTE ADULT - CARE PROVIDER_API CALL
Nestor Valladares (DMD; MD)  OralMaxillofacial Surgery  [address]  [place] [address]  Phone: [phone]  Fax: [phone]  Follow Up Time:
Stable

## 2023-10-31 NOTE — DISCHARGE NOTE ADULT - PROVIDER RX CONTACT NUMBER
Upon review of the In Basket request we were able to locate, review, and update the patient chart as requested for Medicare AW. Any additional questions or concerns should be emailed to the Practice Liaisons via the appropriate education email address, please do not reply via In Basket.     Thank you  Hardeep Agee MA [phone]

## 2023-11-05 NOTE — DISCHARGE NOTE ADULT - COMPLETE THE FOLLOWING IF THE PATIENT REFUSES THE INFLUENZA VACCINE:
11/5/2023    1972  Azul Salazar  [address] [facility] [address]    To Whom It May Concern:    This is to certify that Azul Salazar was evaluated in the Urgent Care on 11/5/2023 excuse from work 11/6/2023 due to illness          Katiana Lundberg MD    [facility] [address] MEDICAL [facility] [place] [address]  [address] [facility] [address] [phone]    
Risks/benefits discussed with patient/surrogate

## 2023-11-16 NOTE — H&P ADULT - ASSESSMENT
no 64 yo M w/ osteomyelitis of right mandible requring debridement and placement of reconstruction plate w/ possible bone grafting via extraoral approach w/ Dr. Valladares in the OR under GA.

## 2023-12-14 NOTE — ED ADULT NURSE NOTE - PRO INTERPRETER NEED 2
53 yr old gentleman is here for fam hx of colon cancer in his dad and personal hx of colon polyps removed each time. Last colonoscopy was in 2013!  Also has tendency towards constipation Denies rectal bleeding, abd pain. Takes celebrex and no other NSAIDs nor any anticoagulants.  No other abdominal symptoms
English

## 2025-01-17 ENCOUNTER — APPOINTMENT (OUTPATIENT)
Dept: ORTHOPEDIC SURGERY | Facility: CLINIC | Age: 70
End: 2025-01-17

## 2025-01-17 ENCOUNTER — NON-APPOINTMENT (OUTPATIENT)
Age: 70
End: 2025-01-17

## 2025-01-17 VITALS — WEIGHT: 182 LBS | BODY MASS INDEX: 29.25 KG/M2 | HEIGHT: 66 IN

## 2025-01-17 DIAGNOSIS — M77.8 OTHER ENTHESOPATHIES, NOT ELSEWHERE CLASSIFIED: ICD-10-CM

## 2025-01-17 PROCEDURE — 73110 X-RAY EXAM OF WRIST: CPT | Mod: RT

## 2025-01-17 PROCEDURE — 99203 OFFICE O/P NEW LOW 30 MIN: CPT

## 2025-01-17 PROCEDURE — L3908: CPT

## 2025-01-17 RX ORDER — METHYLPREDNISOLONE 4 MG/1
4 TABLET ORAL
Qty: 1 | Refills: 0 | Status: ACTIVE | COMMUNITY
Start: 2025-01-17 | End: 1900-01-01

## 2025-02-06 ENCOUNTER — APPOINTMENT (OUTPATIENT)
Dept: ORTHOPEDIC SURGERY | Facility: CLINIC | Age: 70
End: 2025-02-06

## 2025-02-06 DIAGNOSIS — M77.8 OTHER ENTHESOPATHIES, NOT ELSEWHERE CLASSIFIED: ICD-10-CM

## 2025-02-06 PROCEDURE — 99203 OFFICE O/P NEW LOW 30 MIN: CPT

## 2025-02-12 ENCOUNTER — NON-APPOINTMENT (OUTPATIENT)
Age: 70
End: 2025-02-12

## 2025-03-07 ENCOUNTER — NON-APPOINTMENT (OUTPATIENT)
Age: 70
End: 2025-03-07

## 2025-03-07 ENCOUNTER — APPOINTMENT (OUTPATIENT)
Dept: ORTHOPEDIC SURGERY | Facility: CLINIC | Age: 70
End: 2025-03-07

## 2025-03-07 DIAGNOSIS — M77.8 OTHER ENTHESOPATHIES, NOT ELSEWHERE CLASSIFIED: ICD-10-CM

## 2025-03-07 PROCEDURE — 99212 OFFICE O/P EST SF 10 MIN: CPT

## 2025-04-11 ENCOUNTER — APPOINTMENT (OUTPATIENT)
Dept: ORTHOPEDIC SURGERY | Facility: CLINIC | Age: 70
End: 2025-04-11
Payer: OTHER MISCELLANEOUS

## 2025-04-11 VITALS — WEIGHT: 182 LBS | BODY MASS INDEX: 29.25 KG/M2 | HEIGHT: 66 IN

## 2025-04-11 DIAGNOSIS — M77.8 OTHER ENTHESOPATHIES, NOT ELSEWHERE CLASSIFIED: ICD-10-CM

## 2025-04-11 PROCEDURE — 99213 OFFICE O/P EST LOW 20 MIN: CPT | Mod: 25

## 2025-04-11 PROCEDURE — 20550 NJX 1 TENDON SHEATH/LIGAMENT: CPT | Mod: RT

## 2025-05-09 ENCOUNTER — APPOINTMENT (OUTPATIENT)
Dept: MRI IMAGING | Facility: CLINIC | Age: 70
End: 2025-05-09
Payer: OTHER MISCELLANEOUS

## 2025-05-09 ENCOUNTER — APPOINTMENT (OUTPATIENT)
Dept: ORTHOPEDIC SURGERY | Facility: CLINIC | Age: 70
End: 2025-05-09

## 2025-05-09 DIAGNOSIS — M77.8 OTHER ENTHESOPATHIES, NOT ELSEWHERE CLASSIFIED: ICD-10-CM

## 2025-05-09 PROCEDURE — 99213 OFFICE O/P EST LOW 20 MIN: CPT

## 2025-05-09 PROCEDURE — 73221 MRI JOINT UPR EXTREM W/O DYE: CPT | Mod: RT

## 2025-05-20 ENCOUNTER — APPOINTMENT (OUTPATIENT)
Dept: ORTHOPEDIC SURGERY | Facility: CLINIC | Age: 70
End: 2025-05-20

## 2025-05-20 DIAGNOSIS — M77.8 OTHER ENTHESOPATHIES, NOT ELSEWHERE CLASSIFIED: ICD-10-CM

## 2025-05-20 PROCEDURE — 99213 OFFICE O/P EST LOW 20 MIN: CPT

## 2025-06-17 ENCOUNTER — APPOINTMENT (OUTPATIENT)
Dept: ORTHOPEDIC SURGERY | Facility: CLINIC | Age: 70
End: 2025-06-17
Payer: OTHER MISCELLANEOUS

## 2025-06-17 PROCEDURE — 99213 OFFICE O/P EST LOW 20 MIN: CPT

## 2025-07-15 ENCOUNTER — APPOINTMENT (OUTPATIENT)
Dept: ORTHOPEDIC SURGERY | Facility: CLINIC | Age: 70
End: 2025-07-15

## 2025-07-15 PROCEDURE — 20550 NJX 1 TENDON SHEATH/LIGAMENT: CPT

## 2025-07-15 PROCEDURE — 99213 OFFICE O/P EST LOW 20 MIN: CPT | Mod: 25

## 2025-08-26 ENCOUNTER — APPOINTMENT (OUTPATIENT)
Dept: ORTHOPEDIC SURGERY | Facility: CLINIC | Age: 70
End: 2025-08-26
Payer: OTHER MISCELLANEOUS

## 2025-08-26 VITALS — BODY MASS INDEX: 29.25 KG/M2 | WEIGHT: 182 LBS | HEIGHT: 66 IN

## 2025-08-26 DIAGNOSIS — M77.8 OTHER ENTHESOPATHIES, NOT ELSEWHERE CLASSIFIED: ICD-10-CM

## 2025-08-26 PROCEDURE — 99213 OFFICE O/P EST LOW 20 MIN: CPT
